# Patient Record
Sex: FEMALE | Race: WHITE | NOT HISPANIC OR LATINO | Employment: OTHER | URBAN - METROPOLITAN AREA
[De-identification: names, ages, dates, MRNs, and addresses within clinical notes are randomized per-mention and may not be internally consistent; named-entity substitution may affect disease eponyms.]

---

## 2024-09-09 ENCOUNTER — HOSPITAL ENCOUNTER (INPATIENT)
Facility: HOSPITAL | Age: 44
LOS: 3 days | Discharge: HOME | DRG: 637 | End: 2024-09-12
Attending: STUDENT IN AN ORGANIZED HEALTH CARE EDUCATION/TRAINING PROGRAM | Admitting: INTERNAL MEDICINE
Payer: COMMERCIAL

## 2024-09-09 DIAGNOSIS — E13.10 DIABETIC KETOACIDOSIS WITHOUT COMA ASSOCIATED WITH OTHER SPECIFIED DIABETES MELLITUS: Primary | ICD-10-CM

## 2024-09-09 DIAGNOSIS — R73.9 HYPERGLYCEMIA: ICD-10-CM

## 2024-09-09 DIAGNOSIS — E87.20 METABOLIC ACIDOSIS: ICD-10-CM

## 2024-09-09 DIAGNOSIS — R11.2 NAUSEA AND VOMITING, UNSPECIFIED VOMITING TYPE: ICD-10-CM

## 2024-09-09 DIAGNOSIS — R06.02 SOB (SHORTNESS OF BREATH): ICD-10-CM

## 2024-09-09 DIAGNOSIS — U07.1 COVID-19: ICD-10-CM

## 2024-09-09 LAB
ALBUMIN SERPL-MCNC: 3 G/DL (ref 3.5–5)
ALBUMIN SERPL-MCNC: 4.1 G/DL (ref 3.5–5)
ALBUMIN/GLOB SERPL: 0.9 RATIO (ref 1.1–2)
ALBUMIN/GLOB SERPL: 1 RATIO (ref 1.1–2)
ALLENS TEST BLOOD GAS (OHS): YES
ALP SERPL-CCNC: 122 UNIT/L (ref 40–150)
ALP SERPL-CCNC: 90 UNIT/L (ref 40–150)
ALT SERPL-CCNC: 13 UNIT/L (ref 0–55)
ALT SERPL-CCNC: 14 UNIT/L (ref 0–55)
ANION GAP SERPL CALC-SCNC: 14 MEQ/L
ANION GAP SERPL CALC-SCNC: 20 MEQ/L
ANION GAP SERPL CALC-SCNC: 22 MEQ/L
ANION GAP SERPL CALC-SCNC: 26 MMOL/L (ref 8–16)
ANION GAP SERPL CALC-SCNC: 28 MEQ/L
ANION GAP SERPL CALC-SCNC: ABNORMAL MMOL/L
AST SERPL-CCNC: 8 UNIT/L (ref 5–34)
AST SERPL-CCNC: 9 UNIT/L (ref 5–34)
B-OH-BUTYR SERPL-MCNC: 11.5 MMOL/L
BASE EXCESS BLD CALC-SCNC: -28.3 MMOL/L
BASOPHILS # BLD AUTO: 0.06 X10(3)/MCL
BASOPHILS NFR BLD AUTO: 0.4 %
BILIRUB SERPL-MCNC: 0.2 MG/DL
BILIRUB SERPL-MCNC: 0.4 MG/DL
BLOOD GAS SAMPLE TYPE (OHS): ABNORMAL
BNP BLD-MCNC: <10 PG/ML
BUN SERPL-MCNC: 14.4 MG/DL (ref 7–18.7)
BUN SERPL-MCNC: 17.1 MG/DL (ref 7–18.7)
BUN SERPL-MCNC: 19 MG/DL (ref 6–30)
BUN SERPL-MCNC: 19.4 MG/DL (ref 7–18.7)
BUN SERPL-MCNC: 19.5 MG/DL (ref 7–18.7)
BUN SERPL-MCNC: 19.8 MG/DL (ref 7–18.7)
CA-I BLD-SCNC: 1.16 MMOL/L (ref 1.12–1.23)
CALCIUM SERPL-MCNC: 10.3 MG/DL (ref 8.4–10.2)
CALCIUM SERPL-MCNC: 7.8 MG/DL (ref 8.4–10.2)
CALCIUM SERPL-MCNC: 8.1 MG/DL (ref 8.4–10.2)
CALCIUM SERPL-MCNC: 8.3 MG/DL (ref 8.4–10.2)
CALCIUM SERPL-MCNC: 8.4 MG/DL (ref 8.4–10.2)
CHLORIDE SERPL-SCNC: 104 MMOL/L (ref 98–107)
CHLORIDE SERPL-SCNC: 106 MMOL/L (ref 95–110)
CHLORIDE SERPL-SCNC: 107 MMOL/L (ref 98–107)
CHLORIDE SERPL-SCNC: 108 MMOL/L (ref 98–107)
CHLORIDE SERPL-SCNC: 113 MMOL/L (ref 98–107)
CHLORIDE SERPL-SCNC: 95 MMOL/L (ref 98–107)
CO2 BLDA-SCNC: 2.7 MMOL/L
CO2 SERPL-SCNC: 10 MMOL/L (ref 22–29)
CO2 SERPL-SCNC: 5 MMOL/L (ref 22–29)
CO2 SERPL-SCNC: 7 MMOL/L (ref 22–29)
CO2 SERPL-SCNC: 8 MMOL/L (ref 22–29)
CO2 SERPL-SCNC: <5 MMOL/L (ref 22–29)
COHGB MFR BLDA: 1.3 %
CREAT SERPL-MCNC: 0.7 MG/DL (ref 0.5–1.4)
CREAT SERPL-MCNC: 1.17 MG/DL (ref 0.55–1.02)
CREAT SERPL-MCNC: 1.5 MG/DL (ref 0.55–1.02)
CREAT SERPL-MCNC: 1.59 MG/DL (ref 0.55–1.02)
CREAT SERPL-MCNC: 1.86 MG/DL (ref 0.55–1.02)
CREAT SERPL-MCNC: 2.24 MG/DL (ref 0.55–1.02)
CREAT/UREA NIT SERPL: 10
CREAT/UREA NIT SERPL: 11
CREAT/UREA NIT SERPL: 12
CREAT/UREA NIT SERPL: 12
CREAT/UREA NIT SERPL: 9
DRAWN BY BLOOD GAS (OHS): ABNORMAL
EOSINOPHIL # BLD AUTO: 0 X10(3)/MCL (ref 0–0.9)
EOSINOPHIL NFR BLD AUTO: 0 %
ERYTHROCYTE [DISTWIDTH] IN BLOOD BY AUTOMATED COUNT: 11.8 % (ref 11.5–17)
FLUAV AG UPPER RESP QL IA.RAPID: NOT DETECTED
FLUBV AG UPPER RESP QL IA.RAPID: NOT DETECTED
GFR SERPLBLD CREATININE-BSD FMLA CKD-EPI: 27 ML/MIN/1.73/M2
GFR SERPLBLD CREATININE-BSD FMLA CKD-EPI: 34 ML/MIN/1.73/M2
GFR SERPLBLD CREATININE-BSD FMLA CKD-EPI: 41 ML/MIN/1.73/M2
GFR SERPLBLD CREATININE-BSD FMLA CKD-EPI: 44 ML/MIN/1.73/M2
GFR SERPLBLD CREATININE-BSD FMLA CKD-EPI: 59 ML/MIN/1.73/M2
GLOBULIN SER-MCNC: 2.9 GM/DL (ref 2.4–3.5)
GLOBULIN SER-MCNC: 4.4 GM/DL (ref 2.4–3.5)
GLUCOSE SERPL-MCNC: 203 MG/DL (ref 74–100)
GLUCOSE SERPL-MCNC: 375 MG/DL (ref 74–100)
GLUCOSE SERPL-MCNC: 594 MG/DL (ref 70–110)
GLUCOSE SERPL-MCNC: 623 MG/DL (ref 74–100)
GLUCOSE SERPL-MCNC: 717 MG/DL (ref 74–100)
GLUCOSE SERPL-MCNC: 752 MG/DL (ref 74–100)
HCO3 BLDA-SCNC: <3 MMOL/L
HCT VFR BLD AUTO: 48.7 % (ref 37–47)
HCT VFR BLD CALC: 40 %PCV (ref 36–54)
HGB BLD-MCNC: 14 G/DL
HGB BLD-MCNC: 16.5 G/DL (ref 12–16)
IMM GRANULOCYTES # BLD AUTO: 0.12 X10(3)/MCL (ref 0–0.04)
IMM GRANULOCYTES NFR BLD AUTO: 0.8 %
INHALED O2 CONCENTRATION: 21 %
LACTATE SERPL-SCNC: 3.6 MMOL/L (ref 0.5–2.2)
LACTATE SERPL-SCNC: 5.9 MMOL/L (ref 0.5–2.2)
LYMPHOCYTES # BLD AUTO: 1.86 X10(3)/MCL (ref 0.6–4.6)
LYMPHOCYTES NFR BLD AUTO: 12.1 %
MAGNESIUM SERPL-MCNC: 2.4 MG/DL (ref 1.6–2.6)
MAGNESIUM SERPL-MCNC: 2.8 MG/DL (ref 1.6–2.6)
MAGNESIUM SERPL-MCNC: 3.1 MG/DL (ref 1.6–2.6)
MAGNESIUM SERPL-MCNC: 3.6 MG/DL (ref 1.6–2.6)
MCH RBC QN AUTO: 33.5 PG (ref 27–31)
MCHC RBC AUTO-ENTMCNC: 33.9 G/DL (ref 33–36)
MCV RBC AUTO: 99 FL (ref 80–94)
METHGB MFR BLDA: 1 %
MONOCYTES # BLD AUTO: 0.46 X10(3)/MCL (ref 0.1–1.3)
MONOCYTES NFR BLD AUTO: 3 %
MRSA PCR SCRN (OHS): DETECTED
NEUTROPHILS # BLD AUTO: 12.84 X10(3)/MCL (ref 2.1–9.2)
NEUTROPHILS NFR BLD AUTO: 83.7 %
NRBC BLD AUTO-RTO: 0 %
O2 HB BLOOD GAS (OHS): 97.3 %
OHS QRS DURATION: 78 MS
OHS QTC CALCULATION: 492 MS
OXYHGB MFR BLDA: 14.1 G/DL
PCO2 BLDA: <19 MMHG (ref 20–50)
PH BLDA: <6.95 [PH] (ref 7.3–7.6)
PHOSPHATE SERPL-MCNC: 1.5 MG/DL (ref 2.3–4.7)
PHOSPHATE SERPL-MCNC: 2.5 MG/DL (ref 2.3–4.7)
PHOSPHATE SERPL-MCNC: 5.7 MG/DL (ref 2.3–4.7)
PLATELET # BLD AUTO: 438 X10(3)/MCL (ref 130–400)
PMV BLD AUTO: 10.6 FL (ref 7.4–10.4)
PO2 BLDA: 146 MMHG
POC IONIZED CALCIUM: 1.17 MMOL/L (ref 1.06–1.42)
POC TCO2 (MEASURED): 8 MMOL/L (ref 23–29)
POCT GLUCOSE: >500 MG/DL (ref 70–110)
POCT GLUCOSE: >500 MG/DL (ref 70–110)
POTASSIUM BLD-SCNC: 5 MMOL/L (ref 3.5–5.1)
POTASSIUM BLOOD GAS (OHS): 5.2 MMOL/L (ref 3.5–5)
POTASSIUM SERPL-SCNC: 4.2 MMOL/L (ref 3.5–5.1)
POTASSIUM SERPL-SCNC: 4.7 MMOL/L (ref 3.5–5.1)
POTASSIUM SERPL-SCNC: 5 MMOL/L (ref 3.5–5.1)
POTASSIUM SERPL-SCNC: 6 MMOL/L (ref 3.5–5.1)
POTASSIUM SERPL-SCNC: 6.7 MMOL/L (ref 3.5–5.1)
PROT SERPL-MCNC: 5.9 GM/DL (ref 6.4–8.3)
PROT SERPL-MCNC: 8.5 GM/DL (ref 6.4–8.3)
RBC # BLD AUTO: 4.92 X10(6)/MCL (ref 4.2–5.4)
SAMPLE SITE BLOOD GAS (OHS): ABNORMAL
SAMPLE: ABNORMAL
SAO2 % BLDA: 97.4 %
SARS-COV-2 RNA RESP QL NAA+PROBE: DETECTED
SODIUM BLD-SCNC: 134 MMOL/L (ref 136–145)
SODIUM BLOOD GAS (OHS): 126 MMOL/L (ref 137–145)
SODIUM SERPL-SCNC: 130 MMOL/L (ref 136–145)
SODIUM SERPL-SCNC: 131 MMOL/L (ref 136–145)
SODIUM SERPL-SCNC: 135 MMOL/L (ref 136–145)
SODIUM SERPL-SCNC: 136 MMOL/L (ref 136–145)
SODIUM SERPL-SCNC: 137 MMOL/L (ref 136–145)
TROPONIN I SERPL-MCNC: <0.01 NG/ML (ref 0–0.04)
WBC # BLD AUTO: 15.34 X10(3)/MCL (ref 4.5–11.5)

## 2024-09-09 PROCEDURE — 63600175 PHARM REV CODE 636 W HCPCS: Performed by: STUDENT IN AN ORGANIZED HEALTH CARE EDUCATION/TRAINING PROGRAM

## 2024-09-09 PROCEDURE — 36415 COLL VENOUS BLD VENIPUNCTURE: CPT | Performed by: STUDENT IN AN ORGANIZED HEALTH CARE EDUCATION/TRAINING PROGRAM

## 2024-09-09 PROCEDURE — 83735 ASSAY OF MAGNESIUM: CPT | Performed by: NURSE PRACTITIONER

## 2024-09-09 PROCEDURE — 87040 BLOOD CULTURE FOR BACTERIA: CPT | Performed by: NURSE PRACTITIONER

## 2024-09-09 PROCEDURE — 25000003 PHARM REV CODE 250

## 2024-09-09 PROCEDURE — 0240U COVID/FLU A&B PCR: CPT | Performed by: NURSE PRACTITIONER

## 2024-09-09 PROCEDURE — 96361 HYDRATE IV INFUSION ADD-ON: CPT

## 2024-09-09 PROCEDURE — 93010 ELECTROCARDIOGRAM REPORT: CPT | Mod: ,,, | Performed by: STUDENT IN AN ORGANIZED HEALTH CARE EDUCATION/TRAINING PROGRAM

## 2024-09-09 PROCEDURE — 27000207 HC ISOLATION

## 2024-09-09 PROCEDURE — 80053 COMPREHEN METABOLIC PANEL: CPT | Performed by: NURSE PRACTITIONER

## 2024-09-09 PROCEDURE — 99291 CRITICAL CARE FIRST HOUR: CPT

## 2024-09-09 PROCEDURE — 82010 KETONE BODYS QUAN: CPT | Performed by: STUDENT IN AN ORGANIZED HEALTH CARE EDUCATION/TRAINING PROGRAM

## 2024-09-09 PROCEDURE — 96374 THER/PROPH/DIAG INJ IV PUSH: CPT

## 2024-09-09 PROCEDURE — 85025 COMPLETE CBC W/AUTO DIFF WBC: CPT | Performed by: NURSE PRACTITIONER

## 2024-09-09 PROCEDURE — 36600 WITHDRAWAL OF ARTERIAL BLOOD: CPT

## 2024-09-09 PROCEDURE — 20000000 HC ICU ROOM

## 2024-09-09 PROCEDURE — 25000003 PHARM REV CODE 250: Performed by: STUDENT IN AN ORGANIZED HEALTH CARE EDUCATION/TRAINING PROGRAM

## 2024-09-09 PROCEDURE — 80053 COMPREHEN METABOLIC PANEL: CPT | Performed by: STUDENT IN AN ORGANIZED HEALTH CARE EDUCATION/TRAINING PROGRAM

## 2024-09-09 PROCEDURE — 82803 BLOOD GASES ANY COMBINATION: CPT

## 2024-09-09 PROCEDURE — 83880 ASSAY OF NATRIURETIC PEPTIDE: CPT | Performed by: NURSE PRACTITIONER

## 2024-09-09 PROCEDURE — 25000003 PHARM REV CODE 250: Mod: JZ,JG

## 2024-09-09 PROCEDURE — 83605 ASSAY OF LACTIC ACID: CPT | Performed by: NURSE PRACTITIONER

## 2024-09-09 PROCEDURE — 82962 GLUCOSE BLOOD TEST: CPT

## 2024-09-09 PROCEDURE — 93005 ELECTROCARDIOGRAM TRACING: CPT

## 2024-09-09 PROCEDURE — 84100 ASSAY OF PHOSPHORUS: CPT | Performed by: STUDENT IN AN ORGANIZED HEALTH CARE EDUCATION/TRAINING PROGRAM

## 2024-09-09 PROCEDURE — 84484 ASSAY OF TROPONIN QUANT: CPT | Performed by: NURSE PRACTITIONER

## 2024-09-09 PROCEDURE — 99900035 HC TECH TIME PER 15 MIN (STAT)

## 2024-09-09 PROCEDURE — 25000003 PHARM REV CODE 250: Performed by: NURSE PRACTITIONER

## 2024-09-09 PROCEDURE — 63600175 PHARM REV CODE 636 W HCPCS

## 2024-09-09 PROCEDURE — 63600175 PHARM REV CODE 636 W HCPCS: Performed by: NURSE PRACTITIONER

## 2024-09-09 PROCEDURE — 83735 ASSAY OF MAGNESIUM: CPT | Performed by: STUDENT IN AN ORGANIZED HEALTH CARE EDUCATION/TRAINING PROGRAM

## 2024-09-09 PROCEDURE — 87641 MR-STAPH DNA AMP PROBE: CPT | Performed by: STUDENT IN AN ORGANIZED HEALTH CARE EDUCATION/TRAINING PROGRAM

## 2024-09-09 RX ORDER — PROCHLORPERAZINE EDISYLATE 5 MG/ML
5 INJECTION INTRAMUSCULAR; INTRAVENOUS EVERY 6 HOURS PRN
Status: DISCONTINUED | OUTPATIENT
Start: 2024-09-10 | End: 2024-09-12 | Stop reason: HOSPADM

## 2024-09-09 RX ORDER — SODIUM CHLORIDE 9 MG/ML
1000 INJECTION, SOLUTION INTRAVENOUS
Status: ACTIVE | OUTPATIENT
Start: 2024-09-09 | End: 2024-09-10

## 2024-09-09 RX ORDER — SODIUM CHLORIDE AND POTASSIUM CHLORIDE 150; 900 MG/100ML; MG/100ML
INJECTION, SOLUTION INTRAVENOUS CONTINUOUS
Status: DISCONTINUED | OUTPATIENT
Start: 2024-09-09 | End: 2024-09-11

## 2024-09-09 RX ORDER — INDOMETHACIN 25 MG/1
50 CAPSULE ORAL
Status: COMPLETED | OUTPATIENT
Start: 2024-09-09 | End: 2024-09-09

## 2024-09-09 RX ORDER — ONDANSETRON HYDROCHLORIDE 2 MG/ML
4 INJECTION, SOLUTION INTRAVENOUS
Status: COMPLETED | OUTPATIENT
Start: 2024-09-09 | End: 2024-09-09

## 2024-09-09 RX ORDER — SODIUM CHLORIDE 450 MG/100ML
INJECTION, SOLUTION INTRAVENOUS CONTINUOUS
Status: DISCONTINUED | OUTPATIENT
Start: 2024-09-09 | End: 2024-09-11

## 2024-09-09 RX ORDER — SODIUM CHLORIDE 0.9 % (FLUSH) 0.9 %
10 SYRINGE (ML) INJECTION
Status: DISCONTINUED | OUTPATIENT
Start: 2024-09-09 | End: 2024-09-12 | Stop reason: HOSPADM

## 2024-09-09 RX ORDER — CALCIUM GLUCONATE 20 MG/ML
1 INJECTION, SOLUTION INTRAVENOUS ONCE
Status: COMPLETED | OUTPATIENT
Start: 2024-09-09 | End: 2024-09-09

## 2024-09-09 RX ORDER — GLIMEPIRIDE 4 MG/1
1 TABLET ORAL EVERY MORNING
Status: ON HOLD | COMMUNITY
End: 2024-09-12 | Stop reason: HOSPADM

## 2024-09-09 RX ORDER — POTASSIUM CHLORIDE 7.45 MG/ML
60 INJECTION INTRAVENOUS
Status: DISCONTINUED | OUTPATIENT
Start: 2024-09-09 | End: 2024-09-12 | Stop reason: HOSPADM

## 2024-09-09 RX ORDER — LANOLIN ALCOHOL/MO/W.PET/CERES
1 CREAM (GRAM) TOPICAL EVERY MORNING
COMMUNITY

## 2024-09-09 RX ORDER — ONDANSETRON HYDROCHLORIDE 2 MG/ML
8 INJECTION, SOLUTION INTRAVENOUS EVERY 6 HOURS PRN
Status: DISCONTINUED | OUTPATIENT
Start: 2024-09-10 | End: 2024-09-12 | Stop reason: HOSPADM

## 2024-09-09 RX ORDER — DEXTROSE MONOHYDRATE, SODIUM CHLORIDE, AND POTASSIUM CHLORIDE 50; 1.49; 4.5 G/1000ML; G/1000ML; G/1000ML
INJECTION, SOLUTION INTRAVENOUS CONTINUOUS
Status: DISCONTINUED | OUTPATIENT
Start: 2024-09-09 | End: 2024-09-11

## 2024-09-09 RX ORDER — DEXTROSE MONOHYDRATE AND SODIUM CHLORIDE 5; .45 G/100ML; G/100ML
INJECTION, SOLUTION INTRAVENOUS CONTINUOUS PRN
Status: DISCONTINUED | OUTPATIENT
Start: 2024-09-09 | End: 2024-09-12 | Stop reason: HOSPADM

## 2024-09-09 RX ORDER — POTASSIUM CHLORIDE 7.45 MG/ML
40 INJECTION INTRAVENOUS
Status: DISCONTINUED | OUTPATIENT
Start: 2024-09-09 | End: 2024-09-12 | Stop reason: HOSPADM

## 2024-09-09 RX ORDER — ESCITALOPRAM OXALATE 10 MG/1
1 TABLET ORAL EVERY MORNING
COMMUNITY

## 2024-09-09 RX ORDER — SODIUM CHLORIDE 9 MG/ML
INJECTION, SOLUTION INTRAVENOUS CONTINUOUS
Status: DISCONTINUED | OUTPATIENT
Start: 2024-09-09 | End: 2024-09-11

## 2024-09-09 RX ORDER — POTASSIUM CHLORIDE 7.45 MG/ML
80 INJECTION INTRAVENOUS
Status: DISCONTINUED | OUTPATIENT
Start: 2024-09-09 | End: 2024-09-12 | Stop reason: HOSPADM

## 2024-09-09 RX ORDER — SODIUM CHLORIDE 0.9 % (FLUSH) 0.9 %
10 SYRINGE (ML) INJECTION EVERY 6 HOURS PRN
Status: DISCONTINUED | OUTPATIENT
Start: 2024-09-09 | End: 2024-09-12 | Stop reason: HOSPADM

## 2024-09-09 RX ORDER — SODIUM CHLORIDE 9 MG/ML
1000 INJECTION, SOLUTION INTRAVENOUS CONTINUOUS
Status: DISCONTINUED | OUTPATIENT
Start: 2024-09-09 | End: 2024-09-09

## 2024-09-09 RX ORDER — MAGNESIUM SULFATE HEPTAHYDRATE 40 MG/ML
2 INJECTION, SOLUTION INTRAVENOUS
Status: DISCONTINUED | OUTPATIENT
Start: 2024-09-09 | End: 2024-09-12 | Stop reason: HOSPADM

## 2024-09-09 RX ORDER — MUPIROCIN 20 MG/G
OINTMENT TOPICAL 2 TIMES DAILY
Status: DISCONTINUED | OUTPATIENT
Start: 2024-09-11 | End: 2024-09-12 | Stop reason: HOSPADM

## 2024-09-09 RX ORDER — DEXTROSE MONOHYDRATE AND SODIUM CHLORIDE 5; .45 G/100ML; G/100ML
INJECTION, SOLUTION INTRAVENOUS CONTINUOUS
Status: DISCONTINUED | OUTPATIENT
Start: 2024-09-09 | End: 2024-09-11

## 2024-09-09 RX ADMIN — SODIUM CHLORIDE, POTASSIUM CHLORIDE, SODIUM LACTATE AND CALCIUM CHLORIDE 1000 ML: 600; 310; 30; 20 INJECTION, SOLUTION INTRAVENOUS at 04:09

## 2024-09-09 RX ADMIN — POTASSIUM CHLORIDE AND SODIUM CHLORIDE: 900; 150 INJECTION, SOLUTION INTRAVENOUS at 07:09

## 2024-09-09 RX ADMIN — CALCIUM GLUCONATE 1 G: 20 INJECTION, SOLUTION INTRAVENOUS at 04:09

## 2024-09-09 RX ADMIN — POTASSIUM CHLORIDE, DEXTROSE MONOHYDRATE AND SODIUM CHLORIDE: 150; 5; 450 INJECTION, SOLUTION INTRAVENOUS at 10:09

## 2024-09-09 RX ADMIN — VANCOMYCIN HYDROCHLORIDE 1250 MG: 1.25 INJECTION, POWDER, LYOPHILIZED, FOR SOLUTION INTRAVENOUS at 06:09

## 2024-09-09 RX ADMIN — INSULIN HUMAN 0.1 UNITS/KG/HR: 1 INJECTION, SOLUTION INTRAVENOUS at 03:09

## 2024-09-09 RX ADMIN — SODIUM BICARBONATE 50 MEQ: 84 INJECTION, SOLUTION INTRAVENOUS at 03:09

## 2024-09-09 RX ADMIN — SODIUM CHLORIDE 2000 ML: 9 INJECTION, SOLUTION INTRAVENOUS at 01:09

## 2024-09-09 RX ADMIN — SODIUM CHLORIDE: 9 INJECTION, SOLUTION INTRAVENOUS at 05:09

## 2024-09-09 RX ADMIN — SODIUM CHLORIDE 1000 ML: 9 INJECTION, SOLUTION INTRAVENOUS at 03:09

## 2024-09-09 RX ADMIN — INSULIN HUMAN 0.01 UNITS/KG/HR: 1 INJECTION, SOLUTION INTRAVENOUS at 03:09

## 2024-09-09 RX ADMIN — ONDANSETRON 4 MG: 2 INJECTION INTRAMUSCULAR; INTRAVENOUS at 01:09

## 2024-09-09 RX ADMIN — PIPERACILLIN AND TAZOBACTAM 4.5 G: 4; .5 INJECTION, POWDER, LYOPHILIZED, FOR SOLUTION INTRAVENOUS; PARENTERAL at 04:09

## 2024-09-09 NOTE — H&P
[facility] - Emergency Dept  Pulmonary Critical Care Note    Patient Name: Lucila Donald  MRN: [id number]  Admission Date: 9/9/2024  Hospital Length of Stay: 0 days  Code Status: Full Code  Attending Provider: Jv Andersen MD  Primary Care Provider: No primary care provider on file.     Subjective:     HPI:   Patient is a 44-year-old female with a past medical history of type 2 diabetes mellitus currently on glimepiride.  Presented to the ED with a chief complaint of shortness for breath, nausea, vomiting, and body aches going on for a proximally 4 days.  Also reports subjective fever/chills over this time course as well.  Reports full compliance with all prescribed medications.  In ED, patient initially hypotensive with a blood pressure of 89/57 and tachycardic with a heart rate of 118.  Afebrile and satting well on room air.  CBC displayed leukocytosis of 15, erythrocytosis of 16.5, and thrombocytosis 38.  Initial chemistry displaying sodium of 130, potassium of 6.0, serum bicarb of 7, serum creatinine 2.24, .  BOHB elevated at 11.5.  Lactic acid 5.9.  PH on ABG <6.950.  Patient was administered 2 L IVF in ED. patient to be admitted to the ICU for DKA management.      Hospital Course/Significant events:  09/09/2024:  Admitted to the ICU    24 Hour Interval History:  N/A    No past medical history on file.    No past surgical history on file.         No current outpatient medications    Current Inpatient Medications   0.9% NaCl  1,000 mL Intravenous ED 1 Time    [START ON 9/11/2024] mupirocin   Nasal BID    piperacillin-tazobactam (Zosyn) IV (PEDS and ADULTS) (extended infusion is not appropriate)  4.5 g Intravenous ED 1 Time    sodium chloride 0.9%  1,000 mL Intravenous ED 1 Time    vancomycin (VANCOCIN) IV (PEDS and ADULTS)  20 mg/kg Intravenous ED 1 Time       Current Intravenous Infusions   0.45% NaCl   Intravenous Continuous        0.45% NaCl with KCl 20 mEq infusion  150 mL/hr  Intravenous Continuous        0/9% NACL & POTASSIUM CHLORIDE 20 MEQ/L   Intravenous Continuous        0.9% NaCl  1,000 mL Intravenous Continuous        0.9% NaCl   Intravenous Continuous        D5 and 0.45% NaCl   Intravenous Continuous PRN        D5 and 0.45% NaCl   Intravenous Continuous PRN        D5 and 0.45% NaCl   Intravenous Continuous        dextrose 5 % and 0.45 % NaCl with KCl 20 mEq   Intravenous Continuous        insulin regular 1 units/mL infusion orderable (DKA)  0-0.2 Units/kg/hr Intravenous Continuous 6.4 mL/hr at 09/09/24 1522 0.1 Units/kg/hr at 09/09/24 1522         Review of Systems   Constitutional:  Positive for chills and fever. Negative for diaphoresis.        + myalgias   Respiratory:  Positive for cough and shortness of breath.    Cardiovascular:  Negative for chest pain, palpitations and leg swelling.   Gastrointestinal:  Positive for nausea and vomiting. Negative for diarrhea.   Genitourinary: Negative.    Neurological: Negative.    Psychiatric/Behavioral: Negative.            Objective:     No intake or output data in the 24 hours ending 09/09/24 1530      Vital Signs (Most Recent):  Temp: 96.1 °F (35.6 °C) (09/09/24 1227)  Pulse: (!) 118 (09/09/24 1227)  Resp: (!) 24 (09/09/24 1340)  BP: 96/61 (09/09/24 1340)  SpO2: 100 % (09/09/24 1227)  Body mass index is 24.8 kg/m².  Weight: 63.5 kg (140 lb) Vital Signs (24h Range):  Temp:  [96.1 °F (35.6 °C)] 96.1 °F (35.6 °C)  Pulse:  [118] 118  Resp:  [18-24] 24  SpO2:  [100 %] 100 %  BP: (89-96)/(57-61) 96/61     Physical Exam  Vitals reviewed.   Constitutional:       Appearance: She is ill-appearing.   Cardiovascular:      Rate and Rhythm: Regular rhythm. Tachycardia present.      Pulses: Normal pulses.      Heart sounds: No murmur heard.     No friction rub. No gallop.   Pulmonary:      Breath sounds: Normal breath sounds. No wheezing, rhonchi or rales.   Abdominal:      General: There is no distension.      Palpations: Abdomen is soft.       Tenderness: There is no abdominal tenderness.   Musculoskeletal:      Right lower leg: No edema.      Left lower leg: No edema.   Skin:     Capillary Refill: Capillary refill takes more than 3 seconds.   Neurological:      General: No focal deficit present.      Mental Status: She is alert and oriented to person, place, and time.       Lines/Drains/Airways       Peripheral Intravenous Line  Duration                  Peripheral IV - Single Lumen 24 1342 20 G Anterior;Distal;Right Upper Arm <1 day         Peripheral IV - Single Lumen 24 1458 20 G Anterior;Right Forearm <1 day                    Significant Labs:    Lab Results   Component Value Date    WBC 15.34 (H) 2024    HGB 16.5 (H) 2024    HCT 48.7 (H) 2024    MCV 99.0 (H) 2024     (H) 2024           BMP  Lab Results   Component Value Date     (L) 2024    K 6.0 (H) 2024    CO2 7 (LL) 2024    BUN 19.8 (H) 2024    CREATININE 2.24 (H) 2024    CALCIUM 10.3 (H) 2024    AGAP 28.0 2024         ABG  Recent Labs   Lab 24  1440   PH <6.950*   PO2 146.0   PCO2 <19.0*   HCO3 <3.0   POCBASEDEF -28.30       Mechanical Ventilation Support:         Significant Imaging:  I have reviewed the pertinent imaging within the past 24 hours.    Assessment/Plan:     Assessment  Diabetic ketoacidosis  COVID-19 positive    Plan  Admitted to ICU with cardiac monitoring, DKA Protochol initiated  On arrival: CB  Bicarb: 7  Gap: 28  BHB: 11.50  pH: <6.950  No previous A1c on file  Start insulin gtt per protocol with q1h accuchecks while on the drip.    Transition to SQ insulin and continue the drip for additional 2 hours if able to tolerate PO w/o N/V  Bicarb > 18  Anion gap < 12  Glucose < 250 on 2 consecutive readings.    SQ insulin dosing: SSI or 0.5-0.8 U/kg divided between Long and short acting  Once blood sugar reaches 200, transition to D5 1/2 NS @ 150 cc/h  Monitor  electrolytes with BMP q4h, replete per DKA protocol. Keep K > 4, Mg > 2, Phos > 3  Bariatric clear liquid diet while on insulin gtt then change to Diabetic diet when initiating subq insulin with accuchecks 4x daily before meals and at bedtime (AC and HS)  Continue close ICU monitoring. Once off drip and stable, downgrade to floor    DVT Prophylaxis: SCDs    32 minutes of critical care was time spent personally by me on the following activities: development of treatment plan with patient or surrogate and bedside caregivers, discussions with consultants, evaluation of patient's response to treatment, examination of patient, ordering and performing treatments and interventions, ordering and review of laboratory studies, ordering and review of radiographic studies, pulse oximetry, re-evaluation of patient's condition.  This critical care time did not overlap with that of any other provider or involve time for any procedures.     Chito Preciado DO  Pulmonary Critical Care Medicine  [facility] - Emergency Dept  DOS: 09/09/2024

## 2024-09-09 NOTE — FIRST PROVIDER EVALUATION
"Medical screening examination initiated.  I have conducted a focused provider triage encounter, findings are as follows:  Chief Complaint   Patient presents with    Shortness of Breath     Patient reports SOB, nausea, vomiting, and body aches x 4 days. Also endorses fevers and chills last week. Denies abdominal pain, cough, congestion, diarrhea. PMH diabetes. Patient slow to answer in triage.          Brief history of present illness:  43 y/o female presents with sob, nausea, vomiting, bodyaches for 4 days.     Vitals:    09/09/24 1227   BP: (!) 89/57   Pulse: (!) 118   Resp: 18   Temp: 96.1 °F (35.6 °C)   TempSrc: Temporal   SpO2: 100%   Weight: 63.5 kg (140 lb)   Height: 5' 3" (1.6 m)       Pertinent physical exam:  alert, appears to not feel well, in wheel chair, hypotensive     Brief workup plan:  labs, swabs, meds    Preliminary workup initiated; this workup will be continued and followed by the physician or advanced practice provider that is assigned to the patient when roomed.  "

## 2024-09-09 NOTE — ED PROVIDER NOTES
Encounter Date: 9/9/2024       History     Chief Complaint   Patient presents with    Shortness of Breath     Patient reports SOB, nausea, vomiting, and body aches x 4 days. Also endorses fevers and chills last week. Denies abdominal pain, cough, congestion, diarrhea. PMH diabetes. Patient slow to answer in triage.            Patient is a 44-year-old female with past medical history of type 2 diabetes on glimepiride presents to the emergency department for shortness of breath, nausea, vomiting, body aches x4 days.  Has been having some fever and chills for the last few days.  Reports compliance with the medications other than her glimepiride yesterday.  Patient denies any abdominal pain.  Does not use insulin.                  Review of patient's allergies indicates:  No Known Allergies  No past medical history on file.  No past surgical history on file.  No family history on file.     Review of Systems   Constitutional:  Negative for fever.   HENT:  Negative for sore throat.    Eyes:  Negative for visual disturbance.   Respiratory:  Positive for shortness of breath.    Cardiovascular:  Negative for chest pain.   Gastrointestinal:  Negative for abdominal pain.   Genitourinary:  Negative for dysuria.   Musculoskeletal:  Negative for joint swelling.   Skin:  Negative for rash.   Neurological:  Negative for weakness.   Psychiatric/Behavioral:  Negative for confusion.        Physical Exam     Initial Vitals [09/09/24 1227]   BP Pulse Resp Temp SpO2   (!) 89/57 (!) 118 18 96.1 °F (35.6 °C) 100 %      MAP       --         Physical Exam    Nursing note and vitals reviewed.  Constitutional: She appears well-developed and well-nourished.   HENT:   Head: Normocephalic and atraumatic.   Eyes: EOM are normal. Pupils are equal, round, and reactive to light.   Neck:   Normal range of motion.  Cardiovascular:  Normal rate, regular rhythm, normal heart sounds and intact distal pulses.           No murmur heard.  Pulmonary/Chest:  Breath sounds normal. No respiratory distress. She has no wheezes. She has no rales.   Abdominal: Abdomen is soft. She exhibits no distension. There is no abdominal tenderness. There is no rebound.   Musculoskeletal:         General: No tenderness or edema. Normal range of motion.      Cervical back: Normal range of motion.     Neurological: She is alert. She has normal strength. No cranial nerve deficit. GCS score is 15. GCS eye subscore is 4. GCS verbal subscore is 5. GCS motor subscore is 6.   Skin: Skin is warm and dry. Capillary refill takes less than 2 seconds. No rash noted. No erythema.   Psychiatric: She has a normal mood and affect.         ED Course   Critical Care    Date/Time: 9/9/2024 2:53 PM    Performed by: Rudy Sylvester MD  Authorized by: Rudy Sylvester MD  Direct patient critical care time: 15 minutes  Additional history critical care time: 8 minutes  Ordering / reviewing critical care time: 6 minutes  Documentation critical care time: 5 minutes  Consulting other physicians critical care time: 5 minutes  Total critical care time (exclusive of procedural time) : 39 minutes  Critical care time was exclusive of separately billable procedures and treating other patients and teaching time.  Critical care was necessary to treat or prevent imminent or life-threatening deterioration of the following conditions: metabolic crisis, cardiac failure, circulatory failure, dehydration and endocrine crisis.  Critical care was time spent personally by me on the following activities: development of treatment plan with patient or surrogate, discussions with consultants, interpretation of cardiac output measurements, evaluation of patient's response to treatment, examination of patient, obtaining history from patient or surrogate, ordering and performing treatments and interventions, ordering and review of laboratory studies, ordering and review of radiographic studies, pulse oximetry, re-evaluation of patient's  condition and review of old charts.        Labs Reviewed   CBC WITH DIFFERENTIAL - Abnormal       Result Value    WBC 15.34 (*)     RBC 4.92      Hgb 16.5 (*)     Hct 48.7 (*)     MCV 99.0 (*)     MCH 33.5 (*)     MCHC 33.9      RDW 11.8      Platelet 438 (*)     MPV 10.6 (*)     Neut % 83.7      Lymph % 12.1      Mono % 3.0      Eos % 0.0      Basophil % 0.4      Lymph # 1.86      Neut # 12.84 (*)     Mono # 0.46      Eos # 0.00      Baso # 0.06      IG# 0.12 (*)     IG% 0.8      NRBC% 0.0     COMPREHENSIVE METABOLIC PANEL - Abnormal    Sodium 130 (*)     Potassium 6.0 (*)     Chloride 95 (*)     CO2 7 (*)     Glucose 752 (*)     Blood Urea Nitrogen 19.8 (*)     Creatinine 2.24 (*)     Calcium 10.3 (*)     Protein Total 8.5 (*)     Albumin 4.1      Globulin 4.4 (*)     Albumin/Globulin Ratio 0.9 (*)     Bilirubin Total 0.4            ALT 14      AST 9      eGFR 27      Anion Gap 28.0      BUN/Creatinine Ratio 9     MAGNESIUM - Abnormal    Magnesium Level 3.60 (*)    COVID/FLU A&B PCR - Abnormal    Influenza A PCR Not Detected      Influenza B PCR Not Detected      SARS-CoV-2 PCR Detected (*)     Narrative:     The Xpert Xpress SARS-CoV-2/FLU/RSV plus is a rapid, multiplexed real-time PCR test intended for the simultaneous qualitative detection and differentiation of SARS-CoV-2, Influenza A, Influenza B, and respiratory syncytial virus (RSV) viral RNA in either nasopharyngeal swab or nasal swab specimens.         LACTIC ACID, PLASMA - Abnormal    Lactic Acid Level 5.9 (*)    BETA - HYDROXYBUTYRATE, SERUM - Abnormal    Beta Hydroxybutyrate 11.50 (*)    BLOOD GAS - Abnormal    Sample Type Arterial Blood      Sample site Right Radial Artery      Drawn by A SUSAN RRT      pH, Blood gas <6.950 (*)     pCO2, Blood gas <19.0 (*)     pO2, Blood gas 146.0      Sodium, Blood Gas 126 (*)     Potassium, Blood Gas 5.2 (*)     Calcium Level Ionized 1.16      TOC2, Blood gas 2.7      Base Excess, Blood gas -28.30       sO2, Blood gas 97.4      HCO3, Blood gas <3.0      THb, Blood gas 14.1      O2 Hb, Blood Gas 97.3      CO Hgb 1.3      Met Hgb 1.0      Allens Test Yes      FIO2, Blood gas 21     POCT GLUCOSE - Abnormal    POCT Glucose >500 (*)    B-TYPE NATRIURETIC PEPTIDE - Normal    Natriuretic Peptide <10.0     TROPONIN I - Normal    Troponin-I <0.010     BLOOD CULTURE [facility]   BLOOD CULTURE [facility]   URINALYSIS, REFLEX TO URINE CULTURE   PREGNANCY TEST, URINE RAPID   LACTIC ACID, PLASMA   BASIC METABOLIC PANEL   PHOSPHORUS   MAGNESIUM   POCT GLUCOSE MONITORING CONTINUOUS     EKG Readings: (Independently Interpreted)   Sinus tachycardic.  Rate of 119.  Normal intervals.  No STEMI.  Time of 1222       Imaging Results              X-Ray Chest 1 View (Final result)  Result time 09/09/24 12:55:23      Final result by Jamil Mercado MD (09/09/24 12:55:23)                   Impression:      No acute chest disease is identified.      Electronically signed by: Jamil Mercado  Date:    09/09/2024  Time:    12:55               Narrative:    EXAMINATION:  XR CHEST 1 VIEW    CLINICAL HISTORY:  sob;, .    FINDINGS:  No alveolar consolidation, effusion, or pneumothorax is seen.   The thoracic aorta is normal  cardiac silhouette, central pulmonary vessels and mediastinum are normal in size and are grossly unremarkable.   visualized osseous structures are grossly unremarkable.                                       Medications   0.9%  NaCl infusion (has no administration in time range)   lactated ringers bolus 1,000 mL (has no administration in time range)   sodium chloride 0.9% flush 10 mL (has no administration in time range)   insulin regular bolus from bag/infusion 6.35 Units 6.35 mL (has no administration in time range)   insulin regular in 0.9 % NaCl 100 unit/100 mL (1 unit/mL) infusion (has no administration in time range)   dextrose 50% injection 25 g (has no administration in time range)   dextrose 10% bolus 125 mL 125 mL (has no  administration in time range)   sodium chloride 0.9% bolus 1,000 mL 1,000 mL (has no administration in time range)   sodium bicarbonate 150 mEq in D5W 1,000 mL infusion (has no administration in time range)   vancomycin 2 g in dextrose 5 % 500 mL IVPB (has no administration in time range)   piperacillin-tazobactam (ZOSYN) 4.5 g in D5W 100 mL IVPB (MB+) (has no administration in time range)   ondansetron injection 4 mg (4 mg Intravenous Given 9/9/24 1342)   sodium chloride 0.9% bolus 1,905 mL 1,905 mL (2,000 mLs Intravenous New Bag 9/9/24 1344)     Medical Decision Making  Problems Addressed:  COVID-19: acute illness or injury that poses a threat to life or bodily functions  Diabetic ketoacidosis without coma associated with other specified diabetes mellitus: acute illness or injury that poses a threat to life or bodily functions  Hyperglycemia: acute illness or injury that poses a threat to life or bodily functions  Nausea and vomiting, unspecified vomiting type: acute illness or injury that poses a threat to life or bodily functions  SOB (shortness of breath): acute illness or injury that poses a threat to life or bodily functions    Risk  Prescription drug management.  Decision regarding hospitalization.               ED Course as of 09/09/24 1457   Mon Sep 09, 2024   1457 Discussed with ICU who will admit the patient. [RP]      ED Course User Index  [RP] Rudy Sylvester MD                           Clinical Impression:  Final diagnoses:  [R06.02] SOB (shortness of breath)  [E13.10] Diabetic ketoacidosis without coma associated with other specified diabetes mellitus (Primary)  [R11.2] Nausea and vomiting, unspecified vomiting type  [R73.9] Hyperglycemia  [U07.1] COVID-19  [E87.20] Metabolic acidosis          ED Disposition Condition    Admit Critical

## 2024-09-10 LAB
ANION GAP SERPL CALC-SCNC: 11 MEQ/L
ANION GAP SERPL CALC-SCNC: 6 MEQ/L
ANION GAP SERPL CALC-SCNC: 7 MEQ/L
ANION GAP SERPL CALC-SCNC: 9 MEQ/L
B-HCG UR QL: NEGATIVE
BACTERIA #/AREA URNS AUTO: ABNORMAL /HPF
BILIRUB UR QL STRIP.AUTO: NEGATIVE
BUN SERPL-MCNC: 10.3 MG/DL (ref 7–18.7)
BUN SERPL-MCNC: 4.4 MG/DL (ref 7–18.7)
BUN SERPL-MCNC: 6.6 MG/DL (ref 7–18.7)
BUN SERPL-MCNC: 8.1 MG/DL (ref 7–18.7)
CALCIUM SERPL-MCNC: 7.8 MG/DL (ref 8.4–10.2)
CALCIUM SERPL-MCNC: 7.9 MG/DL (ref 8.4–10.2)
CALCIUM SERPL-MCNC: 8.1 MG/DL (ref 8.4–10.2)
CALCIUM SERPL-MCNC: 8.2 MG/DL (ref 8.4–10.2)
CHLORIDE SERPL-SCNC: 110 MMOL/L (ref 98–107)
CHLORIDE SERPL-SCNC: 113 MMOL/L (ref 98–107)
CLARITY UR: CLEAR
CO2 SERPL-SCNC: 14 MMOL/L (ref 22–29)
CO2 SERPL-SCNC: 16 MMOL/L (ref 22–29)
CO2 SERPL-SCNC: 18 MMOL/L (ref 22–29)
CO2 SERPL-SCNC: 18 MMOL/L (ref 22–29)
COLOR UR AUTO: COLORLESS
CREAT SERPL-MCNC: 0.77 MG/DL (ref 0.55–1.02)
CREAT SERPL-MCNC: 0.85 MG/DL (ref 0.55–1.02)
CREAT SERPL-MCNC: 0.91 MG/DL (ref 0.55–1.02)
CREAT SERPL-MCNC: 0.93 MG/DL (ref 0.55–1.02)
CREAT/UREA NIT SERPL: 11
CREAT/UREA NIT SERPL: 6
CREAT/UREA NIT SERPL: 8
CREAT/UREA NIT SERPL: 9
GFR SERPLBLD CREATININE-BSD FMLA CKD-EPI: >60 ML/MIN/1.73/M2
GLUCOSE SERPL-MCNC: 172 MG/DL (ref 74–100)
GLUCOSE SERPL-MCNC: 190 MG/DL (ref 74–100)
GLUCOSE SERPL-MCNC: 200 MG/DL (ref 74–100)
GLUCOSE SERPL-MCNC: 224 MG/DL (ref 74–100)
GLUCOSE UR QL STRIP: ABNORMAL
HGB UR QL STRIP: NEGATIVE
KETONES UR QL STRIP: ABNORMAL
LACTATE SERPL-SCNC: 2.1 MMOL/L (ref 0.5–2.2)
LEUKOCYTE ESTERASE UR QL STRIP: NEGATIVE
MAGNESIUM SERPL-MCNC: 1.8 MG/DL (ref 1.6–2.6)
MAGNESIUM SERPL-MCNC: 1.9 MG/DL (ref 1.6–2.6)
MAGNESIUM SERPL-MCNC: 2 MG/DL (ref 1.6–2.6)
MAGNESIUM SERPL-MCNC: 2 MG/DL (ref 1.6–2.6)
MUCOUS THREADS URNS QL MICRO: ABNORMAL /LPF
NITRITE UR QL STRIP: NEGATIVE
PH UR STRIP: 5 [PH]
PHOSPHATE SERPL-MCNC: 1.1 MG/DL (ref 2.3–4.7)
PHOSPHATE SERPL-MCNC: 1.4 MG/DL (ref 2.3–4.7)
PHOSPHATE SERPL-MCNC: 2.1 MG/DL (ref 2.3–4.7)
PHOSPHATE SERPL-MCNC: 2.3 MG/DL (ref 2.3–4.7)
POCT GLUCOSE: 166 MG/DL (ref 70–110)
POCT GLUCOSE: 169 MG/DL (ref 70–110)
POCT GLUCOSE: 177 MG/DL (ref 70–110)
POCT GLUCOSE: 184 MG/DL (ref 70–110)
POCT GLUCOSE: 190 MG/DL (ref 70–110)
POCT GLUCOSE: 191 MG/DL (ref 70–110)
POCT GLUCOSE: 195 MG/DL (ref 70–110)
POCT GLUCOSE: 195 MG/DL (ref 70–110)
POCT GLUCOSE: 198 MG/DL (ref 70–110)
POCT GLUCOSE: 199 MG/DL (ref 70–110)
POCT GLUCOSE: 200 MG/DL (ref 70–110)
POCT GLUCOSE: 209 MG/DL (ref 70–110)
POCT GLUCOSE: 215 MG/DL (ref 70–110)
POCT GLUCOSE: 225 MG/DL (ref 70–110)
POCT GLUCOSE: 234 MG/DL (ref 70–110)
POCT GLUCOSE: 240 MG/DL (ref 70–110)
POCT GLUCOSE: 252 MG/DL (ref 70–110)
POCT GLUCOSE: 306 MG/DL (ref 70–110)
POCT GLUCOSE: 383 MG/DL (ref 70–110)
POCT GLUCOSE: 406 MG/DL (ref 70–110)
POCT GLUCOSE: >500 MG/DL (ref 70–110)
POTASSIUM SERPL-SCNC: 3.4 MMOL/L (ref 3.5–5.1)
POTASSIUM SERPL-SCNC: 3.9 MMOL/L (ref 3.5–5.1)
POTASSIUM SERPL-SCNC: 4 MMOL/L (ref 3.5–5.1)
POTASSIUM SERPL-SCNC: 4.1 MMOL/L (ref 3.5–5.1)
PROT UR QL STRIP: ABNORMAL
RBC #/AREA URNS AUTO: ABNORMAL /HPF
SODIUM SERPL-SCNC: 135 MMOL/L (ref 136–145)
SODIUM SERPL-SCNC: 136 MMOL/L (ref 136–145)
SODIUM SERPL-SCNC: 138 MMOL/L (ref 136–145)
SODIUM SERPL-SCNC: 139 MMOL/L (ref 136–145)
SP GR UR STRIP.AUTO: 1.02 (ref 1–1.03)
SQUAMOUS #/AREA URNS LPF: ABNORMAL /HPF
UROBILINOGEN UR STRIP-ACNC: NORMAL
WBC #/AREA URNS AUTO: ABNORMAL /HPF
YEAST BUDDING URNS QL: ABNORMAL /HPF

## 2024-09-10 PROCEDURE — 25000003 PHARM REV CODE 250: Performed by: STUDENT IN AN ORGANIZED HEALTH CARE EDUCATION/TRAINING PROGRAM

## 2024-09-10 PROCEDURE — 80048 BASIC METABOLIC PNL TOTAL CA: CPT | Performed by: STUDENT IN AN ORGANIZED HEALTH CARE EDUCATION/TRAINING PROGRAM

## 2024-09-10 PROCEDURE — 63600175 PHARM REV CODE 636 W HCPCS: Performed by: STUDENT IN AN ORGANIZED HEALTH CARE EDUCATION/TRAINING PROGRAM

## 2024-09-10 PROCEDURE — 83735 ASSAY OF MAGNESIUM: CPT | Performed by: STUDENT IN AN ORGANIZED HEALTH CARE EDUCATION/TRAINING PROGRAM

## 2024-09-10 PROCEDURE — 83735 ASSAY OF MAGNESIUM: CPT | Performed by: INTERNAL MEDICINE

## 2024-09-10 PROCEDURE — 11000001 HC ACUTE MED/SURG PRIVATE ROOM

## 2024-09-10 PROCEDURE — 63600175 PHARM REV CODE 636 W HCPCS

## 2024-09-10 PROCEDURE — 27000207 HC ISOLATION

## 2024-09-10 PROCEDURE — 36415 COLL VENOUS BLD VENIPUNCTURE: CPT | Mod: XB | Performed by: INTERNAL MEDICINE

## 2024-09-10 PROCEDURE — 25000003 PHARM REV CODE 250: Performed by: INTERNAL MEDICINE

## 2024-09-10 PROCEDURE — 81025 URINE PREGNANCY TEST: CPT | Performed by: NURSE PRACTITIONER

## 2024-09-10 PROCEDURE — 36415 COLL VENOUS BLD VENIPUNCTURE: CPT | Performed by: STUDENT IN AN ORGANIZED HEALTH CARE EDUCATION/TRAINING PROGRAM

## 2024-09-10 PROCEDURE — 81001 URINALYSIS AUTO W/SCOPE: CPT | Performed by: NURSE PRACTITIONER

## 2024-09-10 PROCEDURE — 83605 ASSAY OF LACTIC ACID: CPT | Performed by: INTERNAL MEDICINE

## 2024-09-10 PROCEDURE — 84100 ASSAY OF PHOSPHORUS: CPT | Performed by: STUDENT IN AN ORGANIZED HEALTH CARE EDUCATION/TRAINING PROGRAM

## 2024-09-10 PROCEDURE — 80048 BASIC METABOLIC PNL TOTAL CA: CPT | Performed by: INTERNAL MEDICINE

## 2024-09-10 PROCEDURE — 84100 ASSAY OF PHOSPHORUS: CPT | Performed by: INTERNAL MEDICINE

## 2024-09-10 RX ORDER — INSULIN GLARGINE 100 [IU]/ML
8 INJECTION, SOLUTION SUBCUTANEOUS 2 TIMES DAILY
Status: DISCONTINUED | OUTPATIENT
Start: 2024-09-10 | End: 2024-09-11

## 2024-09-10 RX ORDER — DEXTROSE, SODIUM CHLORIDE, SODIUM LACTATE, POTASSIUM CHLORIDE, AND CALCIUM CHLORIDE 5; .6; .31; .03; .02 G/100ML; G/100ML; G/100ML; G/100ML; G/100ML
INJECTION, SOLUTION INTRAVENOUS CONTINUOUS
Status: DISCONTINUED | OUTPATIENT
Start: 2024-09-10 | End: 2024-09-11

## 2024-09-10 RX ORDER — INSULIN ASPART 100 [IU]/ML
5 INJECTION, SOLUTION INTRAVENOUS; SUBCUTANEOUS
Status: DISCONTINUED | OUTPATIENT
Start: 2024-09-10 | End: 2024-09-12 | Stop reason: HOSPADM

## 2024-09-10 RX ADMIN — INSULIN ASPART 5 UNITS: 100 INJECTION, SOLUTION INTRAVENOUS; SUBCUTANEOUS at 12:09

## 2024-09-10 RX ADMIN — ONDANSETRON 8 MG: 2 INJECTION INTRAMUSCULAR; INTRAVENOUS at 12:09

## 2024-09-10 RX ADMIN — SODIUM PHOSPHATE, MONOBASIC, MONOHYDRATE AND SODIUM PHOSPHATE, DIBASIC, ANHYDROUS 20.01 MMOL: 142; 276 INJECTION, SOLUTION INTRAVENOUS at 05:09

## 2024-09-10 RX ADMIN — INSULIN ASPART 5 UNITS: 100 INJECTION, SOLUTION INTRAVENOUS; SUBCUTANEOUS at 04:09

## 2024-09-10 RX ADMIN — INSULIN GLARGINE 8 UNITS: 100 INJECTION, SOLUTION SUBCUTANEOUS at 11:09

## 2024-09-10 RX ADMIN — INSULIN HUMAN 0.05 UNITS/KG/HR: 1 INJECTION, SOLUTION INTRAVENOUS at 11:09

## 2024-09-10 RX ADMIN — SODIUM CHLORIDE, POTASSIUM CHLORIDE, SODIUM LACTATE AND CALCIUM CHLORIDE 1000 ML: 600; 310; 30; 20 INJECTION, SOLUTION INTRAVENOUS at 09:09

## 2024-09-10 RX ADMIN — INSULIN GLARGINE 8 UNITS: 100 INJECTION, SOLUTION SUBCUTANEOUS at 08:09

## 2024-09-10 RX ADMIN — SODIUM CHLORIDE, SODIUM LACTATE, POTASSIUM CHLORIDE, CALCIUM CHLORIDE AND DEXTROSE MONOHYDRATE: 5; 600; 310; 30; 20 INJECTION, SOLUTION INTRAVENOUS at 10:09

## 2024-09-10 RX ADMIN — POTASSIUM CHLORIDE 60 MEQ: 7.46 INJECTION, SOLUTION INTRAVENOUS at 05:09

## 2024-09-10 NOTE — PROGRESS NOTES
[facility] - [facility]  Pulmonary Critical Care Note    Patient Name: Lucila Donald  MRN: [id number]  Admission Date: 9/9/2024  Hospital Length of Stay: 1 days  Code Status: Full Code  Attending Provider: Jv Andersen MD  Primary Care Provider: No primary care provider on file.     Subjective:     HPI: Patient is a 44-year-old female with a past medical history of type 2 diabetes mellitus currently on glimepiride.  Presented to the ED with a chief complaint of shortness for breath, nausea, vomiting, and body aches going on for a proximally 4 days.  Also reports subjective fever/chills over this time course as well.  Reports full compliance with all prescribed medications.  In ED, patient initially hypotensive with a blood pressure of 89/57 and tachycardic with a heart rate of 118.  Afebrile and satting well on room air.  CBC displayed leukocytosis of 15, erythrocytosis of 16.5, and thrombocytosis 38.  Initial chemistry displaying sodium of 130, potassium of 6.0, serum bicarb of 7, serum creatinine 2.24, .  BOHB elevated at 11.5.  Lactic acid 5.9.  PH on ABG <6.950.  Patient was administered 2 L IVF in ED. patient to be admitted to the ICU for DKA management.      Hospital Course/Significant events:    24 Hour Interval History:  NAEON. Vitals show patient tachycardic but remaining vitals stable. Patient endorses episode of nausea without vomiting overnight controlled with zofran. Lab work shows improving hyperglycemia and acidosis. Anion gap 9.0 but bicarb still not > 18. Will continue insulin gtt for now. Transition when appropriate.    No past medical history on file.    No past surgical history on file.    Social History     Socioeconomic History    Marital status: Legally            Current Outpatient Medications   Medication Instructions    cyanocobalamin (VITAMIN B-12) 1000 MCG tablet 1 tablet, Oral, Every morning    EScitalopram oxalate (LEXAPRO) 10 MG tablet 1  tablet, Oral, Every morning    glimepiride (AMARYL) 4 MG tablet 1 tablet, Oral, Every morning    multivitamin with minerals tablet 1 tablet, Oral, Every morning       Current Inpatient Medications   [START ON 9/11/2024] mupirocin   Nasal BID       Current Intravenous Infusions   0.45% NaCl   Intravenous Continuous        0.45% NaCl with KCl 20 mEq infusion  150 mL/hr Intravenous Continuous        0/9% NACL & POTASSIUM CHLORIDE 20 MEQ/L   Intravenous Continuous 150 mL/hr at 09/09/24 1908 New Bag at 09/09/24 1908    0.9% NaCl   Intravenous Continuous 150 mL/hr at 09/09/24 1740 New Bag at 09/09/24 1740    D5 and 0.45% NaCl   Intravenous Continuous PRN        D5 and 0.45% NaCl   Intravenous Continuous PRN        D5 and 0.45% NaCl   Intravenous Continuous        dextrose 5 % and 0.45 % NaCl with KCl 20 mEq   Intravenous Continuous 150 mL/hr at 09/09/24 2242 New Bag at 09/09/24 2242    insulin regular 1 units/mL infusion orderable (DKA)  0-0.2 Units/kg/hr Intravenous Continuous 6.4 mL/hr at 09/09/24 1522 0.1 Units/kg/hr at 09/09/24 1522         Review of Systems   Constitutional:  Negative for chills, diaphoresis, fever and malaise/fatigue.   HENT:  Negative for ear pain, sore throat and tinnitus.    Eyes:  Negative for pain, discharge and redness.   Respiratory:  Negative for cough, hemoptysis, sputum production, shortness of breath and wheezing.    Cardiovascular:  Negative for chest pain, orthopnea and leg swelling.   Gastrointestinal:  Positive for nausea. Negative for abdominal pain, blood in stool, constipation, diarrhea, melena and vomiting.   Genitourinary:  Negative for dysuria, flank pain, frequency, hematuria and urgency.   Musculoskeletal:  Negative for joint pain and myalgias.   Neurological:  Negative for headaches.          Objective:       Intake/Output Summary (Last 24 hours) at 9/10/2024 0629  Last data filed at 9/10/2024 0617  Gross per 24 hour   Intake 2318.41 ml   Output 850 ml   Net 1468.41 ml          Vital Signs (Most Recent):  Temp: 96.5 °F (35.8 °C) (09/10/24 0400)  Pulse: 109 (09/10/24 0600)  Resp: 20 (09/10/24 0600)  BP: 105/65 (09/10/24 0600)  SpO2: 99 % (09/10/24 0600)  Body mass index is 26.52 kg/m².  Weight: 65.8 kg (145 lb) Vital Signs (24h Range):  Temp:  [96.1 °F (35.6 °C)-97.4 °F (36.3 °C)] 96.5 °F (35.8 °C)  Pulse:  [105-120] 109  Resp:  [16-29] 20  SpO2:  [98 %-100 %] 99 %  BP: ()/(47-76) 105/65     Physical Exam  Constitutional:       General: She is not in acute distress.     Appearance: Normal appearance. She is normal weight. She is ill-appearing.   HENT:      Head: Normocephalic and atraumatic.   Eyes:      General: No scleral icterus.        Right eye: No discharge.         Left eye: No discharge.      Extraocular Movements: Extraocular movements intact.      Conjunctiva/sclera: Conjunctivae normal.      Pupils: Pupils are equal, round, and reactive to light.   Cardiovascular:      Rate and Rhythm: Regular rhythm. Tachycardia present.      Pulses: Normal pulses.      Heart sounds: No murmur heard.     No friction rub. No gallop.   Pulmonary:      Effort: Pulmonary effort is normal. No respiratory distress.      Breath sounds: Normal breath sounds. No stridor. No wheezing, rhonchi or rales.   Abdominal:      General: Abdomen is flat. Bowel sounds are normal. There is no distension.      Palpations: Abdomen is soft.      Tenderness: There is no abdominal tenderness. There is no guarding.   Musculoskeletal:         General: No swelling. Normal range of motion.      Right lower leg: No edema.      Left lower leg: No edema.   Skin:     General: Skin is warm and dry.      Capillary Refill: Capillary refill takes less than 2 seconds.      Coloration: Skin is not jaundiced or pale.      Findings: No bruising, erythema or lesion.   Neurological:      Mental Status: She is alert.      Comments: AAOx4; CN II-XII grossly intact           Lines/Drains/Airways       Peripheral Intravenous  Line  Duration                  Peripheral IV - Single Lumen 09/09/24 1342 20 G Anterior;Distal;Right Upper Arm <1 day         Peripheral IV - Single Lumen 09/09/24 1458 20 G Anterior;Right Forearm <1 day         Peripheral IV - Single Lumen 09/09/24 1537 22 G Left;Posterior Hand <1 day                    Significant Labs:    Lab Results   Component Value Date    WBC 15.34 (H) 09/09/2024    HGB 16.5 (H) 09/09/2024    HCT 40 09/09/2024    MCV 99.0 (H) 09/09/2024     (H) 09/09/2024           BMP  Lab Results   Component Value Date     09/10/2024    K 4.0 09/10/2024    CO2 14 (L) 09/10/2024    BUN 10.3 09/10/2024    CREATININE 0.93 09/10/2024    CALCIUM 7.8 (L) 09/10/2024    AGAP 9.0 09/10/2024         ABG  Recent Labs   Lab 09/09/24  1440   PH <6.950*   PO2 146.0   PCO2 <19.0*   HCO3 <3.0   POCBASEDEF -28.30       Mechanical Ventilation Support:         Significant Imaging:  I have reviewed the pertinent imaging within the past 24 hours.        Assessment/Plan:     Assessment  Diabetic ketoacidosis  COVID-19 pneumonia    Plan  Continue insulin gtt with q1h accuchecks  Monitor electrolytes with BMP q4h, replete per DKA protocol. Keep K > 4, Mg > 2, Phos > 3  Once blood sugar reaches 200, transition to D5 1/2 NS @ 150 cc/h  Transition to SQ insulin and continue the drip for additional 2 hours if able to tolerate PO w/o N/V  Bicarb > 18  Anion gap < 12  Glucose < 250 on 2 consecutive readings.      DVT Prophylaxis: SCDs  GI Prophylaxis: None     32 minutes of critical care was time spent personally by me on the following activities: development of treatment plan with patient or surrogate and bedside caregivers, discussions with consultants, evaluation of patient's response to treatment, examination of patient, ordering and performing treatments and interventions, ordering and review of laboratory studies, ordering and review of radiographic studies, pulse oximetry, re-evaluation of patient's condition.   This critical care time did not overlap with that of any other provider or involve time for any procedures.     Billy Goldman MD  Pulmonary Critical Care Medicine  [facility] - [facility]  DOS: 09/10/2024

## 2024-09-10 NOTE — PROGRESS NOTES
Inpatient Nutrition Evaluation    Admit Date: 9/9/2024   Total duration of encounter: 1 day   Patient Age: 44 y.o.    Nutrition Recommendation/Prescription     Continue Diet diabetic 2000 Calorie as tolerated.    Nutrition Assessment     Chart Review    Reason Seen: continuous nutrition monitoring and malnutrition screening tool (MST)    Malnutrition Screening Tool Results   Have you recently lost weight without trying?: Unsure  Have you been eating poorly because of a decreased appetite?: Yes   MST Score: 3   Diagnosis:  Diabetic ketoacidosis  COVID-19 positive    Relevant Medical History: type 2 diabetes mellitus     Scheduled Medications:  insulin aspart U-100, 5 Units, TIDWM  insulin glargine U-100, 8 Units, BID  [START ON 9/11/2024] mupirocin, , BID    Continuous Infusions:  0.45% NaCl  0.45% NaCl with KCl 20 mEq infusion  0/9% NACL & POTASSIUM CHLORIDE 20 MEQ/L, Last Rate: 150 mL/hr at 09/09/24 1908  0.9% NaCl, Last Rate: 150 mL/hr at 09/09/24 1740  D5 and 0.45% NaCl  D5 and 0.45% NaCl  D5 and 0.45% NaCl  dextrose 5 % and 0.45 % NaCl with KCl 20 mEq, Last Rate: Stopped (09/10/24 0900)  dextrose 5% lactated ringers, Last Rate: 100 mL/hr at 09/10/24 1009  insulin regular 1 units/mL infusion orderable (DKA), Last Rate: 0.05 Units/kg/hr (09/10/24 1119)    PRN Medications:   Current Facility-Administered Medications:     dextrose 10%, 12.5 g, Intravenous, PRN    dextrose 10%, 25 g, Intravenous, PRN    D5 and 0.45% NaCl, , Intravenous, Continuous PRN    D5 and 0.45% NaCl, , Intravenous, Continuous PRN    magnesium sulfate IVPB, 2 g, Intravenous, PRN    ondansetron, 8 mg, Intravenous, Q6H PRN    potassium chloride, 40 mEq, Intravenous, PRN **AND** potassium chloride, 60 mEq, Intravenous, PRN **AND** potassium chloride, 80 mEq, Intravenous, PRN    prochlorperazine, 5 mg, Intravenous, Q6H PRN    sodium chloride 0.9%, 10 mL, Intravenous, Q6H PRN    sodium chloride 0.9%, 10 mL, Intravenous, PRN    sodium chloride 0.9%,  "10 mL, Intravenous, PRN    sodium chloride 0.9%, 10 mL, Intravenous, PRN    sodium phosphate 20.01 mmol in D5W 250 mL IVPB, 20.01 mmol, Intravenous, PRN    Recent Labs   Lab 09/09/24  1300 09/09/24  1456 09/09/24  1551 09/09/24  1604 09/09/24  1831 09/09/24  2305 09/10/24  0257 09/10/24  0619 09/10/24  1027   * 131*  --  135* 136 137 136 135* 138   K 6.0* 6.7*  --  5.0 4.2 4.7 4.0 3.9 4.1   CALCIUM 10.3* 8.3*  --  7.8* 8.4 8.1* 7.8* 7.9* 8.2*   PHOS  --  5.7*  --   --  2.5 1.5* 1.1* 1.4* 2.1*   MG 3.60* 3.10*  --   --  2.80* 2.40 2.00 2.00 1.90   CL 95* 104  --  107 108* 113* 113* 113* 113*   CO2 7* 5*  --  <5* 8* 10* 14* 16* 18*   BUN 19.8* 19.4*  --  19.5* 17.1 14.4 10.3 8.1 6.6*   CREATININE 2.24* 1.86*  --  1.59* 1.50* 1.17* 0.93 0.91 0.85   EGFRNORACEVR 27 34  --  41 44 59 >60 >60 >60   GLUCOSE 752* 717*  --  623* 375* 203* 224* 190* 172*   BILITOT 0.4  --   --  0.2  --   --   --   --   --    ALKPHOS 122  --   --  90  --   --   --   --   --    ALT 14  --   --  13  --   --   --   --   --    AST 9  --   --  8  --   --   --   --   --    ALBUMIN 4.1  --   --  3.0*  --   --   --   --   --    WBC 15.34*  --   --   --   --   --   --   --   --    HGB 16.5*  --   --   --   --   --   --   --   --    HCT 48.7*  --  40  --   --   --   --   --   --      Nutrition Orders:  Diet diabetic 2000 Calorie      Appetite/Oral Intake: good/% of meals  Factors Affecting Nutritional Intake: none identified  Food/Islam/Cultural Preferences: unable to obtain  Food Allergies: no known food allergies  Last Bowel Movement: 09/05/24  Wound(s):  none documented    Comments    9/10/24: Noted MST indicates unsure wt loss PTA. Unable to verify with pt at time of RD visit. Sleeping soundly, did not awaken to RD voice. No previous EMR wts. Did note N/V x4 days PTA. Discussed with RN, pt with good po intake of AM meal.    Anthropometrics    Height: 5' 2" (157.5 cm), Height Method: Stated  Last Weight: 65.8 kg (145 lb) (09/09/24 " 1822), Weight Method: Stated  BMI (Calculated): 26.5  BMI Classification: overweight (BMI 25-29.9)     Ideal Body Weight (IBW), Female: 110 lb     % Ideal Body Weight, Female (lb): 131.82 %                             Usual Weight Provided By: unable to obtain usual weight    Wt Readings from Last 5 Encounters:   09/09/24 65.8 kg (145 lb)     Weight Change(s) Since Admission:   Wt Readings from Last 1 Encounters:   09/09/24 1822 65.8 kg (145 lb)   09/09/24 1730 65.8 kg (145 lb)   09/09/24 1227 63.5 kg (140 lb)   Admit Weight: 63.5 kg (140 lb) (09/09/24 1227), Weight Method: Stated    Patient Education     Not applicable.    Nutrition Goals & Monitoring     Dietitian will monitor: food and beverage intake and energy intake    Nutrition Risk/Follow-Up: low (follow-up in 5-7 days)  Patients assigned 'low nutrition risk' status do not qualify for a full nutritional assessment but will be monitored and re-evaluated in a 5-7 day time period. Please consult if re-evaluation needed sooner.

## 2024-09-11 LAB
ALBUMIN SERPL-MCNC: 2.7 G/DL (ref 3.5–5)
ALBUMIN/GLOB SERPL: 0.9 RATIO (ref 1.1–2)
ALP SERPL-CCNC: 80 UNIT/L (ref 40–150)
ALT SERPL-CCNC: 14 UNIT/L (ref 0–55)
ANION GAP SERPL CALC-SCNC: 14 MEQ/L
AST SERPL-CCNC: 21 UNIT/L (ref 5–34)
BASOPHILS # BLD AUTO: 0.02 X10(3)/MCL
BASOPHILS NFR BLD AUTO: 0.3 %
BILIRUB SERPL-MCNC: 0.4 MG/DL
BUN SERPL-MCNC: 3.3 MG/DL (ref 7–18.7)
CALCIUM SERPL-MCNC: 7.9 MG/DL (ref 8.4–10.2)
CHLORIDE SERPL-SCNC: 103 MMOL/L (ref 98–107)
CO2 SERPL-SCNC: 20 MMOL/L (ref 22–29)
CREAT SERPL-MCNC: 0.74 MG/DL (ref 0.55–1.02)
CREAT/UREA NIT SERPL: 4
EOSINOPHIL # BLD AUTO: 0.01 X10(3)/MCL (ref 0–0.9)
EOSINOPHIL NFR BLD AUTO: 0.2 %
ERYTHROCYTE [DISTWIDTH] IN BLOOD BY AUTOMATED COUNT: 11.6 % (ref 11.5–17)
GFR SERPLBLD CREATININE-BSD FMLA CKD-EPI: >60 ML/MIN/1.73/M2
GLOBULIN SER-MCNC: 2.9 GM/DL (ref 2.4–3.5)
GLUCOSE SERPL-MCNC: 286 MG/DL (ref 74–100)
HCT VFR BLD AUTO: 34.8 % (ref 37–47)
HGB BLD-MCNC: 12.8 G/DL (ref 12–16)
IMM GRANULOCYTES # BLD AUTO: 0.01 X10(3)/MCL (ref 0–0.04)
IMM GRANULOCYTES NFR BLD AUTO: 0.2 %
LYMPHOCYTES # BLD AUTO: 1.72 X10(3)/MCL (ref 0.6–4.6)
LYMPHOCYTES NFR BLD AUTO: 26.7 %
MCH RBC QN AUTO: 33.8 PG (ref 27–31)
MCHC RBC AUTO-ENTMCNC: 36.8 G/DL (ref 33–36)
MCV RBC AUTO: 91.8 FL (ref 80–94)
MONOCYTES # BLD AUTO: 0.46 X10(3)/MCL (ref 0.1–1.3)
MONOCYTES NFR BLD AUTO: 7.2 %
NEUTROPHILS # BLD AUTO: 4.21 X10(3)/MCL (ref 2.1–9.2)
NEUTROPHILS NFR BLD AUTO: 65.4 %
NRBC BLD AUTO-RTO: 0 %
PLATELET # BLD AUTO: 194 X10(3)/MCL (ref 130–400)
PMV BLD AUTO: 9.9 FL (ref 7.4–10.4)
POCT GLUCOSE: 203 MG/DL (ref 70–110)
POCT GLUCOSE: 239 MG/DL (ref 70–110)
POCT GLUCOSE: 242 MG/DL (ref 70–110)
POTASSIUM SERPL-SCNC: 3.7 MMOL/L (ref 3.5–5.1)
PROT SERPL-MCNC: 5.6 GM/DL (ref 6.4–8.3)
RBC # BLD AUTO: 3.79 X10(6)/MCL (ref 4.2–5.4)
SODIUM SERPL-SCNC: 137 MMOL/L (ref 136–145)
WBC # BLD AUTO: 6.43 X10(3)/MCL (ref 4.5–11.5)

## 2024-09-11 PROCEDURE — 63600175 PHARM REV CODE 636 W HCPCS

## 2024-09-11 PROCEDURE — 25000003 PHARM REV CODE 250: Performed by: STUDENT IN AN ORGANIZED HEALTH CARE EDUCATION/TRAINING PROGRAM

## 2024-09-11 PROCEDURE — 11000001 HC ACUTE MED/SURG PRIVATE ROOM

## 2024-09-11 PROCEDURE — 27000207 HC ISOLATION

## 2024-09-11 PROCEDURE — 85025 COMPLETE CBC W/AUTO DIFF WBC: CPT | Performed by: PHYSICIAN ASSISTANT

## 2024-09-11 PROCEDURE — 36415 COLL VENOUS BLD VENIPUNCTURE: CPT | Performed by: PHYSICIAN ASSISTANT

## 2024-09-11 PROCEDURE — 63600175 PHARM REV CODE 636 W HCPCS: Performed by: INTERNAL MEDICINE

## 2024-09-11 PROCEDURE — 80053 COMPREHEN METABOLIC PANEL: CPT | Performed by: PHYSICIAN ASSISTANT

## 2024-09-11 RX ORDER — ENOXAPARIN SODIUM 100 MG/ML
40 INJECTION SUBCUTANEOUS EVERY 24 HOURS
Status: DISCONTINUED | OUTPATIENT
Start: 2024-09-11 | End: 2024-09-12 | Stop reason: HOSPADM

## 2024-09-11 RX ORDER — ENOXAPARIN SODIUM 100 MG/ML
40 INJECTION SUBCUTANEOUS EVERY 24 HOURS
Status: DISCONTINUED | OUTPATIENT
Start: 2024-09-11 | End: 2024-09-11

## 2024-09-11 RX ORDER — INSULIN GLARGINE 100 [IU]/ML
10 INJECTION, SOLUTION SUBCUTANEOUS 2 TIMES DAILY
Status: DISCONTINUED | OUTPATIENT
Start: 2024-09-11 | End: 2024-09-12

## 2024-09-11 RX ADMIN — INSULIN ASPART 5 UNITS: 100 INJECTION, SOLUTION INTRAVENOUS; SUBCUTANEOUS at 12:09

## 2024-09-11 RX ADMIN — MUPIROCIN: 20 OINTMENT TOPICAL at 08:09

## 2024-09-11 RX ADMIN — INSULIN ASPART 5 UNITS: 100 INJECTION, SOLUTION INTRAVENOUS; SUBCUTANEOUS at 06:09

## 2024-09-11 RX ADMIN — ENOXAPARIN SODIUM 40 MG: 40 INJECTION SUBCUTANEOUS at 09:09

## 2024-09-11 RX ADMIN — INSULIN GLARGINE 8 UNITS: 100 INJECTION, SOLUTION SUBCUTANEOUS at 08:09

## 2024-09-11 RX ADMIN — INSULIN GLARGINE 10 UNITS: 100 INJECTION, SOLUTION SUBCUTANEOUS at 09:09

## 2024-09-11 RX ADMIN — INSULIN ASPART 5 UNITS: 100 INJECTION, SOLUTION INTRAVENOUS; SUBCUTANEOUS at 07:09

## 2024-09-11 NOTE — PLAN OF CARE
09/11/24 0904   Discharge Assessment   Assessment Type Discharge Planning Assessment   Confirmed/corrected address, phone number and insurance Yes   Confirmed Demographics Correct on Facesheet   Source of Information family   Communicated DILIP with patient/caregiver Date not available/Unable to determine   Reason For Admission Metabolic acidosis, DKA   People in Home spouse   Facility Arrived From: none   Do you expect to return to your current living situation? Yes   Do you have help at home or someone to help you manage your care at home? Yes   Who are your caregiver(s) and their phone number(s)? , Kole Donald   Prior to hospitilization cognitive status: Alert/Oriented   Current cognitive status: Unable to Assess   Walking or Climbing Stairs Difficulty no   Dressing/Bathing Difficulty no   Equipment Currently Used at Home glucometer   Patient currently being followed by outpatient case management? No   Do you currently have service(s) that help you manage your care at home? No   Do you take prescription medications? Yes   Do you have prescription coverage? Yes   Coverage BC BS   Do you have any problems affording any of your prescribed medications? No   Is the patient taking medications as prescribed? yes   Who is going to help you get home at discharge? family   How do you get to doctors appointments? car, drives self   Are you on dialysis? No   Do you take coumadin? No   Discharge Plan A Home with family   Discharge Plan B Home Health   DME Needed Upon Discharge  none   Discharge Plan discussed with: Parent(s)   Name(s) and Number(s) mother Yaneli Mcmahan   Transition of Care Barriers None   OTHER   Name(s) of People in Home , Kole Donald

## 2024-09-11 NOTE — H&P
[facility] Hospital Medicine History & Physical Examination       Patient Name: Lucila Donald  MRN: [id number]  Patient Class: IP- Inpatient   Admission Date: 9/9/2024   Admitting Physician: Jv Andersen MD   Length of Stay: 2  Attending Physician: Delroy Gee MD     Primary Care Provider:  Non staff  Face-to-Face encounter date: 09/11/2024  Code Status: Full Code   Chief Complaint: Shortness of Breath (Patient reports SOB, nausea, vomiting, and body aches x 4 days. Also endorses fevers and chills last week. Denies abdominal pain, cough, congestion, diarrhea. PMH diabetes. Patient slow to answer in triage. )        Patient information was obtained from patient, patient's family, past medical records and ER records.     HISTORY OF PRESENT ILLNESS:   Lucila Donald is a 44 y.o. White female with a past medical history of diabetes mellitus type 2 and anxiety/depression. The patient presented to [facility] on 9/9/2024 with a primary complaint of shortness of breath, nausea, vomiting, subjective fevers and body aches.  On presentation patient was hypotensive and tachycardic.  She was afebrile and on room air.  Labs significant for WBC 15, platelets 38, sodium 130, potassium 6.0, bicarb 7, BUN/creatinine 19.8/2.24, glucose 752, magnesium 3.6, lactic acid 5.9, BOHB 11.5, anion gap 28.  ABG with pH less than 6.95, pCO2 less than 19, PO2 146 and HC03 less than 3.  Patient was admitted to ICU for insulin drip.  She was also found to be COVID positive.  As patient without hypoxia no remdesivir or Decadron was initiated.  Insulin drip was discontinued and anion gap closed yesterday (09/10/2024).  She was started on 8 units of Lantus twice a day in addition to sliding scale. Patient was downgraded from ICU to regular floor on 09/10/2024.  On exam she denies complaints of chest pain, shortness of breath, abdominal pain, nausea, vomiting and diarrhea.  Patient reports this is the 1st  admission for DKA.  She has been compliant at home with glimepiride. She is admitted to hospital medicine services for assumption of care.    PAST MEDICAL HISTORY:   Anxiety/depression   Diabetes mellitus type 2    PAST SURGICAL HISTORY:   Cholecystectomy    x3    ALLERGIES:   Patient has no known allergies.    FAMILY HISTORY:   Mother and father with diabetes mellitus    SOCIAL HISTORY:   Denies tobacco, alcohol and illicit drug use    Screening for Social Drivers for health:  Patient screened for food insecurity, housing instability, transportation needs, utility difficulties, and interpersonal safety (select all that apply as identified as concern)  []Housing or Food  []Transportation Needs  []Utility Difficulties  []Interpersonal safety  [x]None    HOME MEDICATIONS:     Prior to Admission medications    Medication Sig Start Date End Date Taking? Authorizing Provider   cyanocobalamin (VITAMIN B-12) 1000 MCG tablet Take 1 tablet by mouth every morning.   Yes Provider, Historical   EScitalopram oxalate (LEXAPRO) 10 MG tablet Take 1 tablet by mouth every morning.   Yes Provider, Historical   glimepiride (AMARYL) 4 MG tablet Take 1 tablet by mouth every morning.   Yes Provider, Historical   multivitamin with minerals tablet Take 1 tablet by mouth every morning.   Yes Provider, Historical       REVIEW OF SYSTEMS:   Except as documented, all other systems reviewed and negative     PHYSICAL EXAM:     VITAL SIGNS: 24 HRS MIN & MAX LAST   Temp  Min: 98.3 °F (36.8 °C)  Max: 98.8 °F (37.1 °C) 98.4 °F (36.9 °C)   BP  Min: 98/68  Max: 136/83 114/84   Pulse  Min: 91  Max: 118  92   Resp  Min: 13  Max: 28 15   SpO2  Min: 96 %  Max: 100 % 99 %       General appearance: Well-developed, well-nourished female in no apparent distress.  No family at bedside.  HEENT: Atraumatic head. Moist mucous membranes of oral cavity.  Lungs: Clear to auscultation bilaterally.   Heart:  Tachycardic rate and rhythm.   Abdomen: Soft,  non-distended, non-tender. Bowel sounds are normal.   Extremities: No cyanosis, clubbing. No deformities.  Skin: No Rash. Warm and dry.  Neuro: Awake, alert and oriented. Motor and sensory exams grossly intact.  Psych/mental status: Appropriate mood and affect. Cooperative. Responds appropriately to questions.       LABS AND IMAGING:     Recent Labs   Lab 09/09/24  1300 09/09/24  1551   WBC 15.34*  --    RBC 4.92  --    HGB 16.5*  --    HCT 48.7* 40   MCV 99.0*  --    MCH 33.5*  --    MCHC 33.9  --    RDW 11.8  --    *  --    MPV 10.6*  --        Recent Labs   Lab 09/09/24  1300 09/09/24  1440 09/09/24  1456 09/09/24  1604 09/09/24  1831 09/10/24  0619 09/10/24  1027 09/10/24  1558   *  --    < > 135*   < > 135* 138 139   K 6.0*  --    < > 5.0   < > 3.9 4.1 3.4*   CL 95*  --    < > 107   < > 113* 113* 110*   CO2 7*  --    < > <5*   < > 16* 18* 18*   BUN 19.8*  --    < > 19.5*   < > 8.1 6.6* 4.4*   CREATININE 2.24*  --    < > 1.59*   < > 0.91 0.85 0.77   CALCIUM 10.3*  --    < > 7.8*   < > 7.9* 8.2* 8.1*   PH  --  <6.950*  --   --   --   --   --   --    MG 3.60*  --    < >  --    < > 2.00 1.90 1.80   ALBUMIN 4.1  --   --  3.0*  --   --   --   --    ALKPHOS 122  --   --  90  --   --   --   --    ALT 14  --   --  13  --   --   --   --    AST 9  --   --  8  --   --   --   --    BILITOT 0.4  --   --  0.2  --   --   --   --     < > = values in this interval not displayed.       Microbiology Results (last 7 days)       Procedure Component Value Units Date/Time    Blood culture #1 **CANNOT BE ORDERED STAT** [[id number]]  (Normal) Collected: 09/09/24 1259    Order Status: Completed Specimen: Blood Updated: 09/10/24 1401     Blood Culture No Growth At 24 Hours    Blood culture #2 **CANNOT BE ORDERED STAT** [[id number]]  (Normal) Collected: 09/09/24 1300    Order Status: Completed Specimen: Blood Updated: 09/10/24 1401     Blood Culture No Growth At 24 Hours             X-Ray Chest 1 View  Narrative:  EXAMINATION:  XR CHEST 1 VIEW    CLINICAL HISTORY:  sob;, .    FINDINGS:  No alveolar consolidation, effusion, or pneumothorax is seen.   The thoracic aorta is normal  cardiac silhouette, central pulmonary vessels and mediastinum are normal in size and are grossly unremarkable.   visualized osseous structures are grossly unremarkable.  Impression: No acute chest disease is identified.    Electronically signed by: Jamil Mercado  Date:    09/09/2024  Time:    12:55        ASSESSMENT & PLAN:   Assessment:  Diabetic ketoacidosis  COVID-19 infection  Anxiety and depression    Plan:  - Continue with Lantus and sliding scale  - Hemoglobin A1c on 07/22/2024 13.9  - Patient only on glimepiride at home will need insulin on discharge  - Follow results of today's CBC and CMP   - Resume appropriate home medications when deemed necessary   - Labs in AM      VTE Prophylaxis: will be placed on Lovenox for DVT prophylaxis and will be advised to be as mobile as possible and sit in a chair as tolerated      __________________________________________________________________________  INPATIENT LIST OF MEDICATIONS     Current Facility-Administered Medications:     0.45% NaCl infusion, , Intravenous, Continuous, Preciado, Chito, DO    0.45% NaCl with KCl 20 mEq infusion, 150 mL/hr, Intravenous, Continuous, Preciado, Chito, DO    0.9 % NaCl with KCl 20 mEq infusion, , Intravenous, Continuous, Preciado, Chito, DO, Last Rate: 150 mL/hr at 09/09/24 1908, New Bag at 09/09/24 1908    0.9%  NaCl infusion, , Intravenous, Continuous, Preciado, Chito, DO, Last Rate: 150 mL/hr at 09/09/24 1740, New Bag at 09/09/24 1740    dextrose 10% bolus 125 mL 125 mL, 12.5 g, Intravenous, Higinio CRUZ Roshan P, MD    dextrose 10% bolus 250 mL 250 mL, 25 g, Intravenous, Higinio CRUZ Roshan P, MD    dextrose 5 % and 0.45 % NaCl infusion, , Intravenous, Continuous PRN, Rudy Sylvester MD    dextrose 5 % and 0.45 % NaCl infusion, , Intravenous, Continuous  PRN, Chito Preciado,     dextrose 5 % and 0.45 % NaCl infusion, , Intravenous, Continuous, PreciadoEmmanuelua,     dextrose 5 % and 0.45 % NaCl with KCl 20 mEq infusion, , Intravenous, Continuous, Chito Preciado DO, Stopped at 09/10/24 0900    dextrose 5 % in lactated ringers infusion, , Intravenous, Continuous, Billy Goldman MD, Last Rate: 100 mL/hr at 09/10/24 1009, New Bag at 09/10/24 1009    insulin aspart U-100 injection 5 Units, 5 Units, Subcutaneous, TIDWM, Chito Preciado, DO, 5 Units at 09/10/24 1628    insulin glargine U-100 (Lantus) injection 8 Units, 8 Units, Subcutaneous, BID, Chito Prceiado, , 8 Units at 09/10/24 2037    insulin regular in 0.9 % NaCl 100 unit/100 mL (1 unit/mL) infusion, 0-0.2 Units/kg/hr, Intravenous, Continuous, Rudy Sylvester MD, Stopped at 09/10/24 1330    magnesium sulfate 2g in water 50mL IVPB (premix), 2 g, Intravenous, PRN, Chito Preciado DO    mupirocin 2 % ointment, , Nasal, BID, Rudy Sylvester MD    ondansetron injection 8 mg, 8 mg, Intravenous, Q6H PRN, Billy Goldman MD, 8 mg at 09/10/24 0015    potassium chloride 10 mEq in 100 mL IVPB, 40 mEq, Intravenous, PRN **AND** potassium chloride 10 mEq in 100 mL IVPB, 60 mEq, Intravenous, PRN, Last Rate: 100 mL/hr at 09/10/24 1746, 60 mEq at 09/10/24 1746 **AND** potassium chloride 10 mEq in 100 mL IVPB, 80 mEq, Intravenous, PRN, Rudy Sylvester MD    prochlorperazine injection Soln 5 mg, 5 mg, Intravenous, Q6H PRN, Billy Goldman MD    sodium chloride 0.9% flush 10 mL, 10 mL, Intravenous, Q6H PRN, Rudy Sylvester MD    sodium chloride 0.9% flush 10 mL, 10 mL, Intravenous, PRN, Rudy Sylvester MD    sodium chloride 0.9% flush 10 mL, 10 mL, Intravenous, PRN, Chito Preciado,     sodium chloride 0.9% flush 10 mL, 10 mL, Intravenous, PRN, Chito Preciado, DO    sodium phosphate 20.01 mmol in D5W 250 mL IVPB, 20.01 mmol, Intravenous, PRN, Jv Andersen MD, Stopped at 09/10/24 0906      Scheduled  Meds:   insulin aspart U-100  5 Units Subcutaneous TIDWM    insulin glargine U-100  8 Units Subcutaneous BID    mupirocin   Nasal BID     Continuous Infusions:   0.45% NaCl   Intravenous Continuous        0.45% NaCl with KCl 20 mEq infusion  150 mL/hr Intravenous Continuous        0/9% NACL & POTASSIUM CHLORIDE 20 MEQ/L   Intravenous Continuous 150 mL/hr at 09/09/24 1908 New Bag at 09/09/24 1908    0.9% NaCl   Intravenous Continuous 150 mL/hr at 09/09/24 1740 New Bag at 09/09/24 1740    D5 and 0.45% NaCl   Intravenous Continuous PRN        D5 and 0.45% NaCl   Intravenous Continuous PRN        D5 and 0.45% NaCl   Intravenous Continuous        dextrose 5 % and 0.45 % NaCl with KCl 20 mEq   Intravenous Continuous   Stopped at 09/10/24 0900    dextrose 5% lactated ringers   Intravenous Continuous 100 mL/hr at 09/10/24 1009 New Bag at 09/10/24 1009    insulin regular 1 units/mL infusion orderable (DKA)  0-0.2 Units/kg/hr Intravenous Continuous   Stopped at 09/10/24 1330     PRN Meds:.  Current Facility-Administered Medications:     dextrose 10%, 12.5 g, Intravenous, PRN    dextrose 10%, 25 g, Intravenous, PRN    D5 and 0.45% NaCl, , Intravenous, Continuous PRN    D5 and 0.45% NaCl, , Intravenous, Continuous PRN    magnesium sulfate IVPB, 2 g, Intravenous, PRN    ondansetron, 8 mg, Intravenous, Q6H PRN    potassium chloride, 40 mEq, Intravenous, PRN **AND** potassium chloride, 60 mEq, Intravenous, PRN **AND** potassium chloride, 80 mEq, Intravenous, PRN    prochlorperazine, 5 mg, Intravenous, Q6H PRN    sodium chloride 0.9%, 10 mL, Intravenous, Q6H PRN    sodium chloride 0.9%, 10 mL, Intravenous, PRN    sodium chloride 0.9%, 10 mL, Intravenous, PRN    sodium chloride 0.9%, 10 mL, Intravenous, PRN    sodium phosphate 20.01 mmol in D5W 250 mL IVPB, 20.01 mmol, Intravenous, PRN      Discharge Planning and Disposition: Anticipated discharge to be determined.    IKylah PA, have reviewed and discussed the case with  Dr. eDlroy Gee MD       Please see the following addendum for further assessment and plan from there attending MD.      Portion of this note is dictated using EMR integrated voice recognition software and may be subjected to voice recognition errors not corrected with proofreading. Please  for clarification if needed.       SHAE BoltonC  09/11/2024    MD Addendum:  I, Dr.Praneet Gerard MD assumed care of this patient today   For the patient encounter, I performed the substantive portion of the visit, I reviewed the NP/PA documentation, treatment plan, and medical decision making.  I had face to face time with this patient     44-year-old female with uncontrolled diabetes A1c 13.9% 7/2024 on glimepiride only at home reportedly follows with endocrinology, being diagnosed with type 2 diabetes around [id number] presented to ED for evaluation of nausea vomiting body pains.  She was tested positive for COVID on admit 09/09/2024, she reported having her symptoms of weakness subjective fevers starting on 09/05/2024.  Patient was noted to be in DKA with profound acidosis <pH 6.95, patient was treated with the DKA protocol in ICU , now cleared to downgrade to floors.  During my interview she reported improved p.o. intake with no nausea, abdominal pain, chest pain shortness on breath but reported feeling generalized weakness .      Patient tachycardic 110s, BP stable on room air    Recent labs showed bicarb 20, glucose 286, BUN 3.3, gap 14    Exam   General: NAD   Abdomen: Soft nontender   Chest: Unlabored breathing   Heart: Regular tachycardic   Extremities: No pedal edema    MDM   DKA-improved, suspected precipitated by COVID-19  Hyperglycemia with Uncontrolled diabetes with a recent A1c 13.9%  Acute COVID-19 infection  Anxiety/depression    Continue to monitor blood sugars, target less than 180  Increase Lantus to 10 b.i.d. prandial insulin 5 t.i.d.  Monitor p.o. intake  Patient is stable  on room air from COVID standpoint  Discussed patient requiring insulin at discharge, patient verbalized understanding  parents at bedside during my interview, discussed  diabetic education, monitoring, yearly ophthalmology, nephro follow-ups frequent labs close follow up with doctors  Home medications discussed with the patient reported she takes multivitamins, glimepiride, Lexapro

## 2024-09-12 VITALS
SYSTOLIC BLOOD PRESSURE: 118 MMHG | TEMPERATURE: 99 F | WEIGHT: 145 LBS | HEART RATE: 112 BPM | RESPIRATION RATE: 19 BRPM | OXYGEN SATURATION: 100 % | HEIGHT: 62 IN | BODY MASS INDEX: 26.68 KG/M2 | DIASTOLIC BLOOD PRESSURE: 83 MMHG

## 2024-09-12 PROBLEM — E11.10 DIABETIC ACIDOSIS WITHOUT COMA: Status: ACTIVE | Noted: 2024-09-12

## 2024-09-12 LAB
POCT GLUCOSE: 241 MG/DL (ref 70–110)
POCT GLUCOSE: 256 MG/DL (ref 70–110)
POCT GLUCOSE: 321 MG/DL (ref 70–110)

## 2024-09-12 PROCEDURE — 63600175 PHARM REV CODE 636 W HCPCS

## 2024-09-12 RX ORDER — INSULIN ASPART 100 [IU]/ML
0-10 INJECTION, SOLUTION INTRAVENOUS; SUBCUTANEOUS
Qty: 15 ML | Refills: 0 | Status: SHIPPED | OUTPATIENT
Start: 2024-09-12 | End: 2025-09-12

## 2024-09-12 RX ORDER — INSULIN GLARGINE 100 [IU]/ML
25 INJECTION, SOLUTION SUBCUTANEOUS NIGHTLY
Qty: 15 ML | Refills: 0 | Status: SHIPPED | OUTPATIENT
Start: 2024-09-12

## 2024-09-12 RX ORDER — INSULIN GLARGINE 100 [IU]/ML
25 INJECTION, SOLUTION SUBCUTANEOUS NIGHTLY
Status: DISCONTINUED | OUTPATIENT
Start: 2024-09-12 | End: 2024-09-12 | Stop reason: HOSPADM

## 2024-09-12 RX ORDER — PEN NEEDLE, DIABETIC 29 G X1/2"
NEEDLE, DISPOSABLE MISCELLANEOUS
Qty: 100 EACH | Refills: 0 | Status: SHIPPED | OUTPATIENT
Start: 2024-09-12

## 2024-09-12 RX ORDER — INSULIN ASPART 100 [IU]/ML
0-10 INJECTION, SOLUTION INTRAVENOUS; SUBCUTANEOUS
Qty: 15 ML | Refills: 0 | Status: SHIPPED | OUTPATIENT
Start: 2024-09-12 | End: 2024-09-12 | Stop reason: SDUPTHER

## 2024-09-12 RX ADMIN — INSULIN ASPART 5 UNITS: 100 INJECTION, SOLUTION INTRAVENOUS; SUBCUTANEOUS at 08:09

## 2024-09-12 NOTE — PLAN OF CARE
Problem: Adult Inpatient Plan of Care  Goal: Plan of Care Review  Outcome: Met  Goal: Patient-Specific Goal (Individualized)  Outcome: Met  Goal: Absence of Hospital-Acquired Illness or Injury  Outcome: Met  Goal: Optimal Comfort and Wellbeing  Outcome: Met  Goal: Readiness for Transition of Care  Outcome: Met     Problem: Skin Injury Risk Increased  Goal: Skin Health and Integrity  Outcome: Met     Problem: Diabetes Comorbidity  Goal: Blood Glucose Level Within Targeted Range  Outcome: Met

## 2024-09-12 NOTE — DISCHARGE SUMMARY
[facility] HOSPITAL MEDICINE - DISCHARGE SUMMARY    Patient Name: Lucila Donald  MRN: [id number]  Admission Date: 9/9/2024  Discharge Date: 09/12/2024  Hospital Length of Stay: 3 days  Discharge Provider: Donald Sylvester MD  Primary Care Provider: Melani, Primary Doctor      HOSPITAL COURSE   Lucila Donald is a 44 y.o. White female with a past medical history of diabetes mellitus type 2 and anxiety/depression. The patient presented to [facility] on 9/9/2024 with a primary complaint of shortness of breath, nausea, vomiting, subjective fevers and body aches.  On presentation patient was hypotensive and tachycardic.  She was afebrile and on room air.  Labs significant for WBC 15, platelets 38, sodium 130, potassium 6.0, bicarb 7, BUN/creatinine 19.8/2.24, glucose 752, magnesium 3.6, lactic acid 5.9, BOHB 11.5, anion gap 28.  ABG with pH less than 6.95, pCO2 less than 19, PO2 146 and HC03 less than 3.  Patient was admitted to ICU for insulin drip.  She was also found to be COVID positive.  As patient without hypoxia no remdesivir or Decadron was initiated.  Insulin drip was discontinued and anion gap closed yesterday (09/10/2024).  She was started on 8 units of Lantus twice a day in addition to sliding scale. Patient was downgraded from ICU to regular floor on 09/10/2024.  On exam she denies complaints of chest pain, shortness of breath, abdominal pain, nausea, vomiting and diarrhea.  Patient reports this is the 1st admission for DKA.  She has been compliant at home with glimepiride. She is admitted to hospital medicine services for assumption of care.       Essentially admitted for COVID-19 infection and found to be in DKA with profound acidosis.  Corrected with insulin drip downgraded to hospitalist service.  At this time she will be transitioned to Lantus 25 units nightly along with sliding scale with meals.  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  Discuss she will need to follow up with endocrinology as soon as possible.  She is to keep a log of her blood glucose.  She states that her last A1c was 13 and she only takes glimepiride at home.        PHYSICAL EXAM     Most Recent Vital Signs:  Temp: 99 °F (37.2 °C) (09/12/24 0800)  Pulse: (!) 112 (09/12/24 0815)  Resp: 19 (09/12/24 0815)  BP: 118/83 (09/12/24 0800)  SpO2: 100 % (09/12/24 0800)   GENERAL: In no acute distress, afebrile  HEENT:  CHEST: Clear to auscultation bilaterally  HEART: S1, S2, no appreciable murmur  ABDOMEN: Soft, nontender, BS +  MSK: Warm, no lower extremity edema, no clubbing or cyanosis  NEUROLOGIC: Alert and oriented x4, moving all extremities with good strength   INTEGUMENTARY:  PSYCHIATRY:          DISCHARGE DIAGNOSIS   COVID-19 infection   Diabetic ketoacidosis   Anxiety/depression   Nonadherence        _____________________________________________________________________________      DISCHARGE MED REC     Current Discharge Medication List        START taking these medications    Details   insulin aspart U-100 (NOVOLOG) 100 unit/mL (3 mL) InPn pen Inject 0-10 Units into the skin as needed (sliding scale). For use prior to meals three times daily, use with sliding scale  Qty: 15 mL, Refills: 0      insulin glargine U-100, Lantus, (LANTUS SOLOSTAR U-100 INSULIN) 100 unit/mL (3 mL) InPn pen Inject 25 Units into the skin every evening.  Qty: 15 mL, Refills: 0      insulin syringe-needle U-100 0.3 mL 30 gauge x 5/16" Syrg For use with lantus pen and short acting insulin pen  Qty: 100 each, Refills: 0    Comments: Please dispense Lantus pen needle tips #50. Please dispense NovoLog pen needle tips #100           CONTINUE these medications which have NOT CHANGED    Details   cyanocobalamin (VITAMIN B-12) 1000 MCG tablet Take 1 tablet by mouth every morning.      EScitalopram oxalate (LEXAPRO) 10 MG tablet Take 1 tablet by mouth every morning.    "   multivitamin with minerals tablet Take 1 tablet by mouth every morning.           STOP taking these medications       glimepiride (AMARYL) 4 MG tablet Comments:   Reason for Stopping:                  CONSULTS         FOLLOW UP      Follow-up Information       Antonio Gann MD Follow up in 1 day(s).    Specialty: Endocrinology  Contact information:  [address] [place] [address] [id number]  [facility] [id number] [phone]                                 DISCHARGE INSTRUCTIONS     Explained in detail to the patient about the discharge plan, medications, and follow-up visits. Pt understands and agrees with the treatment plan.  Discharged Condition: stable  Diet as tolerated  Activities as tolerated  Discharge to: Home or Self Care    TIME SPENT ON DISCHARGE   35 minutes        Donald Sylvester MD  Internal Medicine  Department of [facility] Medicine  [facility]      This document was created using electronic dictation services.  Please excuse any errors that may have been made.  Contact me if any questions regarding documentation to clarify verbiage.

## 2024-09-14 LAB
BACTERIA BLD CULT: NORMAL
BACTERIA BLD CULT: NORMAL

## 2024-10-22 ENCOUNTER — PATIENT MESSAGE (OUTPATIENT)
Dept: RESEARCH | Facility: HOSPITAL | Age: 44
End: 2024-10-22
Payer: COMMERCIAL